# Patient Record
Sex: FEMALE | Race: WHITE | NOT HISPANIC OR LATINO | Employment: FULL TIME | URBAN - METROPOLITAN AREA
[De-identification: names, ages, dates, MRNs, and addresses within clinical notes are randomized per-mention and may not be internally consistent; named-entity substitution may affect disease eponyms.]

---

## 2022-01-18 ENCOUNTER — OFFICE VISIT (OUTPATIENT)
Dept: FAMILY MEDICINE CLINIC | Facility: CLINIC | Age: 62
End: 2022-01-18

## 2022-01-18 VITALS
SYSTOLIC BLOOD PRESSURE: 138 MMHG | HEIGHT: 61 IN | DIASTOLIC BLOOD PRESSURE: 86 MMHG | OXYGEN SATURATION: 99 % | HEART RATE: 90 BPM | BODY MASS INDEX: 31.75 KG/M2 | WEIGHT: 168.2 LBS

## 2022-01-18 DIAGNOSIS — Z12.4 SCREENING FOR CERVICAL CANCER: ICD-10-CM

## 2022-01-18 DIAGNOSIS — Z86.010 H/O COLONOSCOPY WITH POLYPECTOMY: ICD-10-CM

## 2022-01-18 DIAGNOSIS — E11.69 DIABETES MELLITUS TYPE 2 IN OBESE: ICD-10-CM

## 2022-01-18 DIAGNOSIS — E55.9 VITAMIN D DEFICIENCY: ICD-10-CM

## 2022-01-18 DIAGNOSIS — E66.9 DIABETES MELLITUS TYPE 2 IN OBESE: ICD-10-CM

## 2022-01-18 DIAGNOSIS — E61.1 IRON DEFICIENCY: ICD-10-CM

## 2022-01-18 DIAGNOSIS — Z80.3 FH: BREAST CANCER: ICD-10-CM

## 2022-01-18 DIAGNOSIS — I10 PRIMARY HYPERTENSION: ICD-10-CM

## 2022-01-18 DIAGNOSIS — E53.8 VITAMIN B12 DEFICIENCY: ICD-10-CM

## 2022-01-18 DIAGNOSIS — Z98.890 H/O COLONOSCOPY WITH POLYPECTOMY: ICD-10-CM

## 2022-01-18 DIAGNOSIS — K64.9 HEMORRHOIDS, UNSPECIFIED HEMORRHOID TYPE: ICD-10-CM

## 2022-01-18 DIAGNOSIS — Z00.00 WELL ADULT EXAM: Primary | ICD-10-CM

## 2022-01-18 DIAGNOSIS — R15.1 FECAL SMEARING: ICD-10-CM

## 2022-01-18 PROBLEM — Z86.0100 H/O COLONOSCOPY WITH POLYPECTOMY: Status: ACTIVE | Noted: 2022-01-18

## 2022-01-18 PROCEDURE — 99386 PREV VISIT NEW AGE 40-64: CPT | Performed by: FAMILY MEDICINE

## 2022-01-18 PROCEDURE — 96372 THER/PROPH/DIAG INJ SC/IM: CPT | Performed by: FAMILY MEDICINE

## 2022-01-18 RX ORDER — GALCANEZUMAB 120 MG/ML
120 INJECTION, SOLUTION SUBCUTANEOUS
COMMUNITY
End: 2022-03-18 | Stop reason: SDUPTHER

## 2022-01-18 RX ORDER — DULOXETIN HYDROCHLORIDE 60 MG/1
120 CAPSULE, DELAYED RELEASE ORAL DAILY
COMMUNITY
End: 2023-04-04 | Stop reason: DRUGHIGH

## 2022-01-18 RX ORDER — CYANOCOBALAMIN 1000 UG/ML
1000 INJECTION, SOLUTION INTRAMUSCULAR; SUBCUTANEOUS
Status: SHIPPED | OUTPATIENT
Start: 2022-01-18

## 2022-01-18 RX ORDER — LAMOTRIGINE 150 MG/1
150 TABLET ORAL 2 TIMES DAILY
COMMUNITY
End: 2023-01-18 | Stop reason: ALTCHOICE

## 2022-01-18 RX ORDER — DULAGLUTIDE 1.5 MG/.5ML
INJECTION, SOLUTION SUBCUTANEOUS
COMMUNITY
End: 2022-05-18

## 2022-01-18 RX ORDER — QUINAPRIL HCL AND HYDROCHLOROTHIAZIDE 20; 25 MG/1; MG/1
TABLET ORAL
COMMUNITY
Start: 2022-01-15 | End: 2022-07-25 | Stop reason: SDUPTHER

## 2022-01-18 RX ORDER — GLYCOPYRROLATE 2 MG/1
2 TABLET ORAL 2 TIMES DAILY
COMMUNITY
End: 2023-03-07

## 2022-01-18 RX ORDER — DEXTROAMPHETAMINE SACCHARATE, AMPHETAMINE ASPARTATE, DEXTROAMPHETAMINE SULFATE AND AMPHETAMINE SULFATE 5; 5; 5; 5 MG/1; MG/1; MG/1; MG/1
20 TABLET ORAL DAILY
COMMUNITY
End: 2022-08-12 | Stop reason: SDUPTHER

## 2022-01-18 RX ORDER — METFORMIN HYDROCHLORIDE 750 MG/1
750 TABLET, EXTENDED RELEASE ORAL
COMMUNITY
End: 2022-02-21 | Stop reason: SDUPTHER

## 2022-01-18 RX ORDER — DEXTROAMPHETAMINE SACCHARATE, AMPHETAMINE ASPARTATE MONOHYDRATE, DEXTROAMPHETAMINE SULFATE AND AMPHETAMINE SULFATE 5; 5; 5; 5 MG/1; MG/1; MG/1; MG/1
20 CAPSULE, EXTENDED RELEASE ORAL EVERY MORNING
COMMUNITY
End: 2023-03-07

## 2022-01-18 RX ADMIN — CYANOCOBALAMIN 1000 MCG: 1000 INJECTION, SOLUTION INTRAMUSCULAR; SUBCUTANEOUS at 15:34

## 2022-01-18 NOTE — PROGRESS NOTES
"Chief Complaint  Establish Care, Annual Exam, Gynecologic Exam (Pt states that she normally sees [facility] but they have left and would like to get that done today. ), Diarrhea, Fatigue (Pt states that she had labs done and her D3,B-12, and Iron were low. ), and Hemorrhoids    Subjective          Vivian Galvez presents to [facility] for   History of Present Illness     Teaching for 24 years.  for .     No h/o abnormal pap smears.     Mammograms every year, done at [facility] done in Summer 2021. No h/o abnormal mammogram.     She had a couple colon polyps removed, last colonoscopy done around 3 years ago with [facility] in [place]. She saw GI in [place] and was prescribed glycopyrrolate and has helped diarrhea. She had urgent fecal incontinence or a little leaks out. Hemorrhoids get inflamed since she gave birth, she has used hydrocortisone cream. Diarrhea makes hemorrhoids more painful.     A few weeks ago blood work. B12 shots, prescription vitamin D and start multivitamin with iron. B12 shot every 2 weeks, last injection 2 weeks ago. 6 months ago she had 3 rounds of b12 injection.     She hasn't had A1c checked in over a year. Using trulicity and metformin.         Objective   Vital Signs:   Vitals:    01/18/22 1442   BP: 138/86   Pulse: 90   SpO2: 99%   Weight: 76.3 kg (168 lb 3.2 oz)   Height: 154.9 cm (61\")   PainSc: 0-No pain     Body mass index is 31.78 kg/m².      Physical Exam  Vitals reviewed. Exam conducted with a chaperone present.   Constitutional:       General: She is not in acute distress.     Appearance: She is obese. She is not ill-appearing.   HENT:      Right Ear: Tympanic membrane and ear canal normal.      Left Ear: Tympanic membrane and ear canal normal.   Eyes:      General:         Right eye: No discharge.         Left eye: No discharge.      Conjunctiva/sclera: Conjunctivae normal.   Neck:      Thyroid: No thyromegaly. "   Cardiovascular:      Rate and Rhythm: Normal rate and regular rhythm.   Pulmonary:      Effort: Pulmonary effort is normal. No respiratory distress.      Breath sounds: Normal breath sounds.   Chest:   Breasts:      Right: Normal. No mass, nipple discharge, skin change, tenderness, axillary adenopathy or supraclavicular adenopathy.      Left: Normal. No mass, nipple discharge, skin change, tenderness, axillary adenopathy or supraclavicular adenopathy.       Abdominal:      Palpations: Abdomen is soft.      Tenderness: There is no abdominal tenderness.   Genitourinary:     General: Normal vulva.      Exam position: Lithotomy position.      Pubic Area: No rash.       Labia:         Right: No lesion.         Left: No lesion.       Urethra: No urethral lesion.      Vagina: Normal.      Cervix: No discharge, erythema or cervical bleeding.      Uterus: Normal.       Adnexa:         Right: No mass or tenderness.          Left: No mass or tenderness.        Rectum: No external hemorrhoid.      Comments: Normal external genitalia  Questionable small endocervical polyp    Musculoskeletal:      Cervical back: Neck supple.      Right lower leg: No edema.      Left lower leg: No edema.   Lymphadenopathy:      Head:      Right side of head: No submandibular, preauricular or posterior auricular adenopathy.      Left side of head: No submandibular, preauricular or posterior auricular adenopathy.      Cervical: No cervical adenopathy.      Right cervical: No superficial cervical adenopathy.     Left cervical: No superficial cervical adenopathy.      Upper Body:      Right upper body: No supraclavicular or axillary adenopathy.      Left upper body: No supraclavicular or axillary adenopathy.   Skin:     General: Skin is warm and dry.      Findings: No rash.   Neurological:      Mental Status: She is alert and oriented to person, place, and time.      Gait: Gait normal.   Psychiatric:         Mood and Affect: Mood normal.          Behavior: Behavior normal.         Thought Content: Thought content normal.         Judgment: Judgment normal.        Result Review :                Immunization History   Administered Date(s) Administered   • COVID-19 (MODERNA) 1st, 2nd, 3rd Dose Only 01/28/2021, 02/25/2021, 12/17/2021   • FluLaval/Fluarix/Fluzone >6 11/01/2021   • Hepatitis A 11/13/2019       Health Maintenance   Topic Date Due   • MAMMOGRAM  Never done   • TDAP/TD VACCINES (1 - Tdap) Never done   • ZOSTER VACCINE (1 of 2) Never done   • INFLUENZA VACCINE  Never done   • PAP SMEAR  Never done     Administrations This Visit     cyanocobalamin injection 1,000 mcg     Admin Date  01/18/2022 Action  Given Dose  1,000 mcg Route  Intramuscular Administered By  Regina Bautista MA                     Assessment and Plan    Diagnoses and all orders for this visit:    1. Well adult exam (Primary)  -     CBC (No Diff); Future  -     Comprehensive Metabolic Panel; Future  -     TSH Rfx On Abnormal To Free T4; Future  -     Lipid Panel; Future  -     Hemoglobin A1c; Future  -     Vitamin D 25 Hydroxy; Future  -     Vitamin B12; Future  -     Iron Profile; Future  Return when fasting to check labs.  Mammogram, colonoscopy, and previous primary care records requested.  2. Screening for cervical cancer  -     IGP,rfx Aptima HPV All Pth; Future  Pap today with cotesting and if normal will not need further Pap smears.  3. Vitamin D deficiency  -     Vitamin D 25 Hydroxy; Future  Currently on prescription replacement check level with next labs  4. Vitamin B12 deficiency  -     cyanocobalamin injection 1,000 mcg  -     Vitamin B12; Future  Currently on injections and she was due today which was administered and she tolerated the injection well.  Check follow-up labs.  5. Iron deficiency  -     CBC (No Diff); Future  -     Iron Profile; Future  Currently on oral replacement.  Check follow-up labs.  6. FH: breast cancer  Recommend yearly mammograms.  Clinical breast  exam normal today as well.  7. H/O colonoscopy with polypectomy  She reports her last colonoscopy 3 years ago.  She does have a history of polyps and advised 5-year recall's.  8. Fecal smearing  Previous gastroenterologist has prescribed glycopyrrolate which controls her symptoms.  9. Hemorrhoids, unspecified hemorrhoid type  Discussed with patient using steroid cream for symptomatic relief or if worsening consultation with colorectal surgeons to see if she would be a candidate for procedure.  10. Diabetes mellitus type 2 in obese (HCC)  -     Comprehensive Metabolic Panel; Future  -     Lipid Panel; Future  -     Hemoglobin A1c; Future  Check complete set of labs.  A1c goal less than 6.5.  11. Primary hypertension  Blood pressure well controlled today continue current dose of quinapril hydrochlorothiazide.      Counseled on health maintenance topics and preventative care recommendations: cervical cancer screening, breast cancer screening, colon cancer screening, supplements ,      Follow Up   Return in about 4 months (around 5/18/2022) for Office visit diabetes and physical in 1 year.  Patient was given instructions and counseling regarding her condition or for health maintenance advice. Please see specific information pulled into the AVS if appropriate.     Electronically signed by Peyton Flynn MD, 01/18/22, 3:46 PM EST.

## 2022-01-28 LAB
25(OH)D3+25(OH)D2 SERPL-MCNC: 26.8 NG/ML (ref 30–100)
ALBUMIN SERPL-MCNC: 4.3 G/DL (ref 3.8–4.8)
ALBUMIN/GLOB SERPL: 1.8 {RATIO} (ref 1.2–2.2)
ALP SERPL-CCNC: 53 IU/L (ref 44–121)
ALT SERPL-CCNC: 17 IU/L (ref 0–32)
AMBIG ABBREV CMP14 DEFAULT: NORMAL
AMBIG ABBREV LP DEFAULT: NORMAL
AST SERPL-CCNC: 19 IU/L (ref 0–40)
BASOPHILS # BLD AUTO: 0.1 X10E3/UL (ref 0–0.2)
BASOPHILS NFR BLD AUTO: 1 %
BILIRUB SERPL-MCNC: 0.4 MG/DL (ref 0–1.2)
BUN SERPL-MCNC: 12 MG/DL (ref 8–27)
BUN/CREAT SERPL: 19 (ref 12–28)
CALCIUM SERPL-MCNC: 10.1 MG/DL (ref 8.7–10.3)
CHLORIDE SERPL-SCNC: 100 MMOL/L (ref 96–106)
CHOLEST SERPL-MCNC: 241 MG/DL (ref 100–199)
CO2 SERPL-SCNC: 26 MMOL/L (ref 20–29)
CREAT SERPL-MCNC: 0.62 MG/DL (ref 0.57–1)
EOSINOPHIL # BLD AUTO: 0.2 X10E3/UL (ref 0–0.4)
EOSINOPHIL NFR BLD AUTO: 4 %
ERYTHROCYTE [DISTWIDTH] IN BLOOD BY AUTOMATED COUNT: 16.1 % (ref 11.7–15.4)
GLOBULIN SER CALC-MCNC: 2.4 G/DL (ref 1.5–4.5)
GLUCOSE SERPL-MCNC: 93 MG/DL (ref 65–99)
HBA1C MFR BLD: 6.2 % (ref 4.8–5.6)
HCT VFR BLD AUTO: 36 % (ref 34–46.6)
HDLC SERPL-MCNC: 74 MG/DL
HGB BLD-MCNC: 10.5 G/DL (ref 11.1–15.9)
IMM GRANULOCYTES # BLD AUTO: 0 X10E3/UL (ref 0–0.1)
IMM GRANULOCYTES NFR BLD AUTO: 0 %
IRON SERPL-MCNC: 79 UG/DL (ref 27–139)
LDLC SERPL CALC-MCNC: 134 MG/DL (ref 0–99)
LYMPHOCYTES # BLD AUTO: 2.3 X10E3/UL (ref 0.7–3.1)
LYMPHOCYTES NFR BLD AUTO: 39 %
MCH RBC QN AUTO: 19.8 PG (ref 26.6–33)
MCHC RBC AUTO-ENTMCNC: 29.2 G/DL (ref 31.5–35.7)
MCV RBC AUTO: 68 FL (ref 79–97)
MONOCYTES # BLD AUTO: 0.5 X10E3/UL (ref 0.1–0.9)
MONOCYTES NFR BLD AUTO: 9 %
NEUTROPHILS # BLD AUTO: 2.7 X10E3/UL (ref 1.4–7)
NEUTROPHILS NFR BLD AUTO: 47 %
PLATELET # BLD AUTO: 531 X10E3/UL (ref 150–450)
POTASSIUM SERPL-SCNC: 4.7 MMOL/L (ref 3.5–5.2)
PROT SERPL-MCNC: 6.7 G/DL (ref 6–8.5)
RBC # BLD AUTO: 5.29 X10E6/UL (ref 3.77–5.28)
SODIUM SERPL-SCNC: 140 MMOL/L (ref 134–144)
TRIGL SERPL-MCNC: 190 MG/DL (ref 0–149)
TSH SERPL-ACNC: 0.79 UIU/ML (ref 0.45–4.5)
VIT B12 SERPL-MCNC: 468 PG/ML (ref 232–1245)
VLDLC SERPL CALC-MCNC: 33 MG/DL (ref 5–40)
WBC # BLD AUTO: 5.8 X10E3/UL (ref 3.4–10.8)

## 2022-01-31 ENCOUNTER — TELEPHONE (OUTPATIENT)
Dept: FAMILY MEDICINE CLINIC | Facility: CLINIC | Age: 62
End: 2022-01-31

## 2022-01-31 DIAGNOSIS — E78.5 DYSLIPIDEMIA: Primary | ICD-10-CM

## 2022-01-31 RX ORDER — ATORVASTATIN CALCIUM 20 MG/1
20 TABLET, FILM COATED ORAL NIGHTLY
Qty: 90 TABLET | Refills: 3 | Status: SHIPPED | OUTPATIENT
Start: 2022-01-31 | End: 2022-12-06 | Stop reason: SDUPTHER

## 2022-01-31 RX ORDER — CYANOCOBALAMIN 1000 UG/ML
INJECTION, SOLUTION INTRAMUSCULAR; SUBCUTANEOUS
COMMUNITY
Start: 2022-01-22 | End: 2022-12-15 | Stop reason: SDUPTHER

## 2022-01-31 NOTE — TELEPHONE ENCOUNTER
Called and spoke with pt. Informed pt per EDS, The test results show anemia.  Normal white blood cell count.  Sugar and A1c show good diabetes control with A1c less than 6.5.  Normal kidney function, electrolytes, liver function, and thyroid function.  Vitamin D is borderline decreased.  Normal iron and B12 levels.  Cholesterol level is high, I recommend starting atorvastatin at nighttime to help reduce heart attack and stroke risk which has been sent to pts pharmacy.  Plan to repeat fasting labs at your next visit. Pt verbalized understanding and has no further questions at this time.

## 2022-01-31 NOTE — TELEPHONE ENCOUNTER
Please call patient about abnormal cholesterol results and need for new medication.  I also sent a letter in my chart.      The test results show anemia.  Normal white blood cell count.  Sugar and A1c show good diabetes control with A1c less than 6.5.  Normal kidney function, electrolytes, liver function, and thyroid function.  Vitamin D is borderline decreased.  Normal iron and B12 levels.  Cholesterol level is high, I recommend starting atorvastatin at nighttime to help reduce heart attack and stroke risk.  Plan to repeat fasting labs at your next visit.

## 2022-02-02 ENCOUNTER — TELEPHONE (OUTPATIENT)
Dept: FAMILY MEDICINE CLINIC | Facility: CLINIC | Age: 62
End: 2022-02-02

## 2022-02-02 NOTE — TELEPHONE ENCOUNTER
Caller: [facility]     Best call back number: [phone]    What is the best time to reach you: ANYTIME     Who are you requesting to speak with (clinical staff, provider,  specific staff member):     What was the call regarding: THE CENTRALIZED RECORDS DEPARTMENT IS CALLING TO SEE WHAT IS THE TIME FRAME THAT THIS OFFICE IS NEEDING THE RECORDS THAT THEY ARE REQUESTING. THEY STATE THAT THEY RECEIVED A FAX BUT TH BOTTOM OF THE FAX DID NOT COME THROUGH CLEAR ENOUGH FOR THEM READ.     Do you require a callback: YES

## 2022-02-21 RX ORDER — METFORMIN HYDROCHLORIDE 750 MG/1
750 TABLET, EXTENDED RELEASE ORAL
Qty: 90 TABLET | Refills: 1 | Status: SHIPPED | OUTPATIENT
Start: 2022-02-21 | End: 2022-08-08

## 2022-02-21 NOTE — TELEPHONE ENCOUNTER
Rx Refill Note  Requested Prescriptions     Pending Prescriptions Disp Refills   • metFORMIN ER (GLUCOPHAGE-XR) 750 MG 24 hr tablet 90 tablet 1     Sig: Take 1 tablet by mouth Daily With Breakfast.      Last office visit with prescribing clinician: 1/18/2022      Next office visit with prescribing clinician: 5/18/2022            Erika Hoang MA  02/21/22, 08:51 EST

## 2022-03-02 ENCOUNTER — CLINICAL SUPPORT (OUTPATIENT)
Dept: FAMILY MEDICINE CLINIC | Facility: CLINIC | Age: 62
End: 2022-03-02

## 2022-03-02 PROCEDURE — 96372 THER/PROPH/DIAG INJ SC/IM: CPT | Performed by: FAMILY MEDICINE

## 2022-03-02 RX ADMIN — CYANOCOBALAMIN 1000 MCG: 1000 INJECTION, SOLUTION INTRAMUSCULAR; SUBCUTANEOUS at 15:04

## 2022-03-18 NOTE — TELEPHONE ENCOUNTER
Rx Refill Note  Requested Prescriptions     Pending Prescriptions Disp Refills   • galcanezumab-gnlm (Emgality) 120 MG/ML auto-injector pen 1.12 mL 1     Sig: Inject 1 mL under the skin into the appropriate area as directed Every 30 (Thirty) Days.      Last office visit with prescribing clinician: 1/18/2022      Next office visit with prescribing clinician: 5/18/2022            Erika Hoang MA  03/18/22, 16:48 EDT

## 2022-03-21 RX ORDER — GALCANEZUMAB 120 MG/ML
120 INJECTION, SOLUTION SUBCUTANEOUS
Qty: 1.12 ML | Refills: 1 | Status: SHIPPED | OUTPATIENT
Start: 2022-03-21 | End: 2022-05-18 | Stop reason: SDUPTHER

## 2022-04-12 DIAGNOSIS — E53.8 B12 DEFICIENCY: Primary | ICD-10-CM

## 2022-04-12 RX ORDER — NEEDLES, DISPOSABLE 25GX5/8"
1 NEEDLE, DISPOSABLE MISCELLANEOUS WEEKLY
Qty: 10 EACH | Refills: 5 | Status: CANCELLED | OUTPATIENT
Start: 2022-04-12

## 2022-04-12 RX ORDER — SYRINGE W-NEEDLE,DISPOSAB,3 ML 25GX5/8"
1 SYRINGE, EMPTY DISPOSABLE MISCELLANEOUS DAILY
Qty: 10 EACH | Refills: 5 | Status: SHIPPED | OUTPATIENT
Start: 2022-04-12

## 2022-04-12 RX ORDER — NEEDLES, DISPOSABLE 25GX5/8"
1 NEEDLE, DISPOSABLE MISCELLANEOUS
Qty: 10 EACH | Refills: 5 | Status: SHIPPED | OUTPATIENT
Start: 2022-04-12 | End: 2023-03-06 | Stop reason: SDUPTHER

## 2022-04-12 NOTE — TELEPHONE ENCOUNTER
"Rx Refill Note  Requested Prescriptions     Signed Prescriptions Disp Refills   • Syringe 20G X 1\" 3 ML misc 10 each 5     Si each Daily.     Authorizing Provider: ALEA SANTOS     Ordering User: ERIKA HOANG   • Needle, Disp, (BD Disp Needles) 21G X 1-1/2\" misc 10 each 5     Si each Every 30 (Thirty) Days.     Authorizing Provider: ALEA SANTOS     Ordering User: ERIKA HOANG      Last office visit with prescribing clinician: 2022      Next office visit with prescribing clinician: 2022            Erika Hoang MA  22, 12:21 EDT     Attempted to contact, no answer. Left message to advise patient of prescriptions sent for supplies   "

## 2022-05-18 ENCOUNTER — OFFICE VISIT (OUTPATIENT)
Dept: FAMILY MEDICINE CLINIC | Facility: CLINIC | Age: 62
End: 2022-05-18

## 2022-05-18 ENCOUNTER — LAB (OUTPATIENT)
Dept: LAB | Facility: HOSPITAL | Age: 62
End: 2022-05-18

## 2022-05-18 VITALS
SYSTOLIC BLOOD PRESSURE: 138 MMHG | TEMPERATURE: 98.6 F | HEART RATE: 99 BPM | DIASTOLIC BLOOD PRESSURE: 71 MMHG | OXYGEN SATURATION: 97 % | BODY MASS INDEX: 33.68 KG/M2 | HEIGHT: 61 IN | WEIGHT: 178.4 LBS

## 2022-05-18 DIAGNOSIS — Z00.00 WELL ADULT EXAM: ICD-10-CM

## 2022-05-18 DIAGNOSIS — E53.8 B12 DEFICIENCY: ICD-10-CM

## 2022-05-18 DIAGNOSIS — E11.69 DIABETES MELLITUS TYPE 2 IN OBESE: Primary | ICD-10-CM

## 2022-05-18 DIAGNOSIS — I10 PRIMARY HYPERTENSION: ICD-10-CM

## 2022-05-18 DIAGNOSIS — E66.9 DIABETES MELLITUS TYPE 2 IN OBESE: Primary | ICD-10-CM

## 2022-05-18 DIAGNOSIS — A60.04 HERPES SIMPLEX VULVOVAGINITIS: ICD-10-CM

## 2022-05-18 DIAGNOSIS — R53.83 OTHER FATIGUE: ICD-10-CM

## 2022-05-18 DIAGNOSIS — G43.009 MIGRAINE WITHOUT AURA AND WITHOUT STATUS MIGRAINOSUS, NOT INTRACTABLE: ICD-10-CM

## 2022-05-18 DIAGNOSIS — E53.8 VITAMIN B12 DEFICIENCY: ICD-10-CM

## 2022-05-18 LAB
EXPIRATION DATE: NORMAL
HBA1C MFR BLD: 6.8 %
Lab: NORMAL

## 2022-05-18 PROCEDURE — 82607 VITAMIN B-12: CPT

## 2022-05-18 PROCEDURE — 83036 HEMOGLOBIN GLYCOSYLATED A1C: CPT | Performed by: FAMILY MEDICINE

## 2022-05-18 PROCEDURE — 99214 OFFICE O/P EST MOD 30 MIN: CPT | Performed by: FAMILY MEDICINE

## 2022-05-18 RX ORDER — LAMOTRIGINE 200 MG/1
TABLET ORAL
COMMUNITY
Start: 2022-04-29 | End: 2022-11-18 | Stop reason: DRUGHIGH

## 2022-05-18 RX ORDER — DULAGLUTIDE 3 MG/.5ML
3 INJECTION, SOLUTION SUBCUTANEOUS WEEKLY
Qty: 4 PEN | Refills: 5 | Status: SHIPPED | OUTPATIENT
Start: 2022-05-18 | End: 2022-09-27 | Stop reason: SDUPTHER

## 2022-05-18 RX ORDER — VALACYCLOVIR HYDROCHLORIDE 500 MG/1
500 TABLET, FILM COATED ORAL 2 TIMES DAILY
Qty: 12 TABLET | Refills: 3 | Status: SHIPPED | OUTPATIENT
Start: 2022-05-18

## 2022-05-18 RX ORDER — GALCANEZUMAB 120 MG/ML
120 INJECTION, SOLUTION SUBCUTANEOUS
Qty: 1 ML | Refills: 5 | Status: SHIPPED | OUTPATIENT
Start: 2022-05-18 | End: 2022-12-15

## 2022-05-18 NOTE — PROGRESS NOTES
"Chief Complaint  Diabetes (Type 2)    Subjective          Vivian Galvez presents to [facility]  Diabetes  She presents for her follow-up diabetic visit. She has type 2 diabetes mellitus. Current diabetic treatment includes oral agent (monotherapy) (GLP-1a). An ACE inhibitor/angiotensin II receptor blocker is being taken.     Home glucose monitoring in mid afternoon 100-120. She has had a bigger appetite the past month or so. She is suffering from significant anxiety. She eats when she is stressed.   She has appointment next week with psychiatrist.     No home blood pressure monitoring.     She is tired, sleepiness, fatigue. B12 shots have helped but not enough. She is interested in sleep study.     Emgality for migraines.     She has outbreaks of genital herpes around twice a year.  Her last outbreak occurred about a month ago.      Objective   Vital Signs:  /71   Pulse 99   Temp 98.6 °F (37 °C)   Ht 154.9 cm (60.98\")   Wt 80.9 kg (178 lb 6.4 oz)   SpO2 97%   BMI 33.73 kg/m²    Vitals:    05/18/22 1541 05/18/22 1606   BP: 146/82 138/71   BP Location: Left arm    Patient Position: Sitting    Cuff Size: Adult    Pulse: 99    Temp: 98.6 °F (37 °C)    SpO2: 97%    Weight: 80.9 kg (178 lb 6.4 oz)    Height: 154.9 cm (60.98\")    PainSc: 0-No pain                Physical Exam  Vitals reviewed.   Constitutional:       General: She is not in acute distress.     Appearance: She is obese. She is not ill-appearing.   Cardiovascular:      Rate and Rhythm: Normal rate and regular rhythm.   Pulmonary:      Effort: Pulmonary effort is normal.      Breath sounds: Normal breath sounds.   Neurological:      Mental Status: She is alert.   Psychiatric:         Mood and Affect: Mood is anxious and depressed.        Result Review :   The following data was reviewed by: Peyton Flynn MD on 05/18/2022:  Common labs    Common Labsle 1/27/22 1/27/22 1/27/22 1/27/22 5/18/22    1146 1146 " 1146 1146    Glucose  93      BUN  12      Creatinine  0.62      eGFR Non  Am  98      eGFR African Am  113      Sodium  140      Potassium  4.7      Chloride  100      Calcium  10.1      Total Protein  6.7      Albumin  4.3      Total Bilirubin  0.4      Alkaline Phosphatase  53      AST (SGOT)  19      ALT (SGPT)  17      WBC 5.8       Hemoglobin 10.5 (A)       Hematocrit 36.0       Platelets 531 (A)       Total Cholesterol   241 (A)     Triglycerides   190 (A)     HDL Cholesterol   74     LDL Cholesterol    134 (A)     Hemoglobin A1C    6.2 (A) 6.8   (A) Abnormal value       Comments are available for some flowsheets but are not being displayed.                     Assessment and Plan    Diagnoses and all orders for this visit:    1. Diabetes mellitus type 2 in obese (HCC) (Primary)  -     POC Glycosylated Hemoglobin (Hb A1C)  -     Dulaglutide (Trulicity) 3 MG/0.5ML solution pen-injector; Inject 0.5 mL under the skin into the appropriate area as directed 1 (One) Time Per Week.  Dispense: 4 pen; Refill: 5  Uncontrolled.  Goal A1c less than 6.5.  Recommend checking morning fasting glucose readings as well.  Continue metformin.  Increase dose of Trulicity to 3 mg.  Recheck in 3 months.  2. Primary hypertension  Blood pressure improved on recheck.  Continue quinapril-hydrochlorothiazide.  3. Other fatigue  -     Ambulatory Referral to Sleep Medicine  Work-up for sleep apnea.  4. Migraine without aura and without status migrainosus, not intractable  -     galcanezumab-gnlm (Emgality) 120 MG/ML auto-injector pen; Inject 1 mL under the skin into the appropriate area as directed Every 30 (Thirty) Days.  Dispense: 1 mL; Refill: 5  Continue Emgality.  5. B12 deficiency  -     Vitamin B12; Future  Check follow-up labs since starting IM therapy  6. Herpes simplex vulvovaginitis  -     valACYclovir (Valtrex) 500 MG tablet; Take 1 tablet by mouth 2 (Two) Times a Day. For 3 days per episode  Dispense: 12 tablet; Refill:  3  Recommend episodic treatment with infrequent outbreaks.           Follow Up   Return in about 3 months (around 8/18/2022) for Office visit diabetes.  Patient was given instructions and counseling regarding her condition or for health maintenance advice. Please see specific information pulled into the AVS if appropriate.     Electronically signed by Peyton Flynn MD, 05/18/22, 4:12 PM EDT.

## 2022-05-18 NOTE — PATIENT INSTRUCTIONS
Goal blood pressure < 140/90.    Check fingerstick glucose in the morning before eating, goal <130.   Check fingerstick 2 hours after a meal, goal <180.   Goal A1c < 6.5.

## 2022-05-19 ENCOUNTER — TELEPHONE (OUTPATIENT)
Dept: FAMILY MEDICINE CLINIC | Facility: CLINIC | Age: 62
End: 2022-05-19

## 2022-05-19 LAB — VIT B12 BLD-MCNC: 410 PG/ML (ref 211–946)

## 2022-05-19 NOTE — TELEPHONE ENCOUNTER
Attempted to contact patient to relay results no answer left vm to call the office back       Per DR. Peyton Flynn MD   B12 level is in the normal range      Hub may relay message

## 2022-07-25 ENCOUNTER — PATIENT MESSAGE (OUTPATIENT)
Dept: FAMILY MEDICINE CLINIC | Facility: CLINIC | Age: 62
End: 2022-07-25

## 2022-07-25 RX ORDER — QUINAPRIL HCL AND HYDROCHLOROTHIAZIDE 20; 25 MG/1; MG/1
1 TABLET ORAL DAILY
Qty: 90 TABLET | Refills: 0 | Status: SHIPPED | OUTPATIENT
Start: 2022-07-25 | End: 2022-10-20

## 2022-08-08 RX ORDER — METFORMIN HYDROCHLORIDE 750 MG/1
TABLET, EXTENDED RELEASE ORAL
Qty: 90 TABLET | Refills: 1 | Status: SHIPPED | OUTPATIENT
Start: 2022-08-08 | End: 2022-09-27 | Stop reason: SDUPTHER

## 2022-08-12 ENCOUNTER — OFFICE VISIT (OUTPATIENT)
Dept: FAMILY MEDICINE CLINIC | Facility: CLINIC | Age: 62
End: 2022-08-12

## 2022-08-12 ENCOUNTER — LAB (OUTPATIENT)
Dept: LAB | Facility: HOSPITAL | Age: 62
End: 2022-08-12

## 2022-08-12 VITALS
HEART RATE: 74 BPM | WEIGHT: 178 LBS | SYSTOLIC BLOOD PRESSURE: 122 MMHG | OXYGEN SATURATION: 95 % | DIASTOLIC BLOOD PRESSURE: 80 MMHG | BODY MASS INDEX: 33.61 KG/M2 | HEIGHT: 61 IN

## 2022-08-12 DIAGNOSIS — E66.9 DIABETES MELLITUS TYPE 2 IN OBESE: ICD-10-CM

## 2022-08-12 DIAGNOSIS — R68.89 HEAT INTOLERANCE: ICD-10-CM

## 2022-08-12 DIAGNOSIS — Z23 NEED FOR VACCINATION: ICD-10-CM

## 2022-08-12 DIAGNOSIS — E78.5 DYSLIPIDEMIA: ICD-10-CM

## 2022-08-12 DIAGNOSIS — D64.9 ANEMIA, UNSPECIFIED TYPE: ICD-10-CM

## 2022-08-12 DIAGNOSIS — E11.9 TYPE 2 DIABETES MELLITUS WITHOUT COMPLICATION, WITHOUT LONG-TERM CURRENT USE OF INSULIN: Primary | ICD-10-CM

## 2022-08-12 DIAGNOSIS — E53.8 B12 DEFICIENCY: ICD-10-CM

## 2022-08-12 DIAGNOSIS — E55.9 VITAMIN D DEFICIENCY: ICD-10-CM

## 2022-08-12 DIAGNOSIS — R53.83 OTHER FATIGUE: ICD-10-CM

## 2022-08-12 DIAGNOSIS — E11.9 TYPE 2 DIABETES MELLITUS WITHOUT COMPLICATION, WITHOUT LONG-TERM CURRENT USE OF INSULIN: ICD-10-CM

## 2022-08-12 DIAGNOSIS — Z00.00 WELL ADULT EXAM: ICD-10-CM

## 2022-08-12 DIAGNOSIS — E11.69 DIABETES MELLITUS TYPE 2 IN OBESE: ICD-10-CM

## 2022-08-12 DIAGNOSIS — E61.1 IRON DEFICIENCY: ICD-10-CM

## 2022-08-12 LAB
25(OH)D3 SERPL-MCNC: 28 NG/ML (ref 30–100)
ALBUMIN SERPL-MCNC: 4.4 G/DL (ref 3.5–5.2)
ALBUMIN UR-MCNC: <1.2 MG/DL
ALBUMIN/GLOB SERPL: 1.8 G/DL
ALP SERPL-CCNC: 57 U/L (ref 39–117)
ALT SERPL W P-5'-P-CCNC: 17 U/L (ref 1–33)
ANION GAP SERPL CALCULATED.3IONS-SCNC: 11 MMOL/L (ref 5–15)
AST SERPL-CCNC: 18 U/L (ref 1–32)
BILIRUB SERPL-MCNC: 0.6 MG/DL (ref 0–1.2)
BUN SERPL-MCNC: 14 MG/DL (ref 8–23)
BUN/CREAT SERPL: 20 (ref 7–25)
CALCIUM SPEC-SCNC: 10 MG/DL (ref 8.6–10.5)
CHLORIDE SERPL-SCNC: 98 MMOL/L (ref 98–107)
CHOLEST SERPL-MCNC: 181 MG/DL (ref 0–200)
CO2 SERPL-SCNC: 30 MMOL/L (ref 22–29)
CREAT SERPL-MCNC: 0.7 MG/DL (ref 0.57–1)
DEPRECATED RDW RBC AUTO: 37.9 FL (ref 37–54)
EGFRCR SERPLBLD CKD-EPI 2021: 98.5 ML/MIN/1.73
ERYTHROCYTE [DISTWIDTH] IN BLOOD BY AUTOMATED COUNT: 15.5 % (ref 12.3–15.4)
GLOBULIN UR ELPH-MCNC: 2.4 GM/DL
GLUCOSE SERPL-MCNC: 120 MG/DL (ref 65–99)
HBA1C MFR BLD: 6.4 % (ref 4.8–5.6)
HCT VFR BLD AUTO: 38 % (ref 34–46.6)
HDLC SERPL-MCNC: 75 MG/DL (ref 40–60)
HGB BLD-MCNC: 11 G/DL (ref 12–15.9)
IRON 24H UR-MRATE: 64 MCG/DL (ref 37–145)
IRON SATN MFR SERPL: 12 % (ref 20–50)
LDLC SERPL CALC-MCNC: 80 MG/DL (ref 0–100)
LDLC/HDLC SERPL: 1.01 {RATIO}
MCH RBC QN AUTO: 20.1 PG (ref 26.6–33)
MCHC RBC AUTO-ENTMCNC: 28.9 G/DL (ref 31.5–35.7)
MCV RBC AUTO: 69.5 FL (ref 79–97)
PLATELET # BLD AUTO: 488 10*3/MM3 (ref 140–450)
PMV BLD AUTO: 9.4 FL (ref 6–12)
POTASSIUM SERPL-SCNC: 4.3 MMOL/L (ref 3.5–5.2)
PROT SERPL-MCNC: 6.8 G/DL (ref 6–8.5)
RBC # BLD AUTO: 5.47 10*6/MM3 (ref 3.77–5.28)
SODIUM SERPL-SCNC: 139 MMOL/L (ref 136–145)
TIBC SERPL-MCNC: 530 MCG/DL (ref 298–536)
TRANSFERRIN SERPL-MCNC: 356 MG/DL (ref 200–360)
TRIGL SERPL-MCNC: 153 MG/DL (ref 0–150)
TSH SERPL DL<=0.05 MIU/L-ACNC: 1.07 UIU/ML (ref 0.27–4.2)
VIT B12 BLD-MCNC: 358 PG/ML (ref 211–946)
VLDLC SERPL-MCNC: 26 MG/DL (ref 5–40)
WBC NRBC COR # BLD: 6.64 10*3/MM3 (ref 3.4–10.8)

## 2022-08-12 PROCEDURE — 82607 VITAMIN B-12: CPT

## 2022-08-12 PROCEDURE — 86038 ANTINUCLEAR ANTIBODIES: CPT

## 2022-08-12 PROCEDURE — 36415 COLL VENOUS BLD VENIPUNCTURE: CPT

## 2022-08-12 PROCEDURE — 83540 ASSAY OF IRON: CPT

## 2022-08-12 PROCEDURE — 99214 OFFICE O/P EST MOD 30 MIN: CPT | Performed by: FAMILY MEDICINE

## 2022-08-12 PROCEDURE — 84466 ASSAY OF TRANSFERRIN: CPT

## 2022-08-12 PROCEDURE — 80061 LIPID PANEL: CPT

## 2022-08-12 PROCEDURE — 90471 IMMUNIZATION ADMIN: CPT | Performed by: FAMILY MEDICINE

## 2022-08-12 PROCEDURE — 82306 VITAMIN D 25 HYDROXY: CPT

## 2022-08-12 PROCEDURE — 82043 UR ALBUMIN QUANTITATIVE: CPT

## 2022-08-12 PROCEDURE — 83036 HEMOGLOBIN GLYCOSYLATED A1C: CPT

## 2022-08-12 PROCEDURE — 90677 PCV20 VACCINE IM: CPT | Performed by: FAMILY MEDICINE

## 2022-08-12 PROCEDURE — 80050 GENERAL HEALTH PANEL: CPT

## 2022-08-12 NOTE — PROGRESS NOTES
"Chief Complaint  Diabetes    Subjective        Vivian Galvez presents to [facility]  Diabetes  She presents for her follow-up diabetic visit. She has type 2 diabetes mellitus. Current diabetic treatment includes oral agent (monotherapy) (GLP-1a). An ACE inhibitor/angiotensin II receptor blocker is being taken.       She saw psychiatrist yesterday about saliva hormone testing. She has fatigue and tired all the time. Last period around age 46. No menopausal symptoms. She is hot all the time. Affecting quality of life. No arthralgia or myalgia. Stress fracture in left foot and tendonitis in right foot. She wasn't able to do her job and now early long-term. She is depressed. Psychiatrist has tired many medications, no recent changes.   Some morning she doesn't feel like she has woken up and drowsy. Last colonoscopy around 4 years ago.     She checks blood sugar and it has been around 130. Highest 180. She doesn't have as much protein in her diet that she likes. She lives alone and has convience foods. She has tried protein shakes for breakfast.       Objective   Vital Signs:  /80   Pulse 74   Ht 154.9 cm (60.98\")   Wt 80.7 kg (178 lb)   SpO2 95%   BMI 33.65 kg/m²   Estimated body mass index is 33.65 kg/m² as calculated from the following:    Height as of this encounter: 154.9 cm (60.98\").    Weight as of this encounter: 80.7 kg (178 lb).          Physical Exam  Vitals reviewed.   Constitutional:       General: She is not in acute distress.     Appearance: She is obese. She is not ill-appearing.   Neck:      Thyroid: No thyroid mass, thyromegaly or thyroid tenderness.   Cardiovascular:      Rate and Rhythm: Normal rate and regular rhythm.   Pulmonary:      Effort: Pulmonary effort is normal.      Breath sounds: Normal breath sounds.   Neurological:      Mental Status: She is alert.   Psychiatric:         Mood and Affect: Mood is depressed.        Result Review :{Labs  Result " Review  Imaging  Med Tab  Media  Procedures  :23}                 Immunization History   Administered Date(s) Administered   • COVID-19 (MODERNA) 1st, 2nd, 3rd Dose Only 01/28/2021, 02/25/2021, 12/17/2021   • FluLaval/Fluarix/Fluzone >6 11/01/2021   • Hepatitis A 11/13/2019   • Pneumococcal Conjugate 20-Valent (PCV20) 08/12/2022       Assessment and Plan {CC Problem List  Visit Diagnosis   ROS  Review (Popup)  Health Maintenance  Quality  BestPractice  Medications  SmartSets  SnapShot Encounters  Media :23}  Diagnoses and all orders for this visit:    1. Type 2 diabetes mellitus without complication, without long-term current use of insulin (HCC) (Primary)  -     Hemoglobin A1c; Future  -     MicroAlbumin, Urine, Random - Urine, Clean Catch; Future  -     Comprehensive Metabolic Panel; Future  -     Lipid Panel; Future  Point-of-care machine unavailable we will check blood work for A1c.  A1c goal 6.5.  If still not at goal plan to increase Trulicity to 4.5 mg and continue metformin.  Recheck in 3 months.  2. Dyslipidemia  -     Comprehensive Metabolic Panel; Future  -     Lipid Panel; Future  Initiated on atorvastatin 20 mg at the end of January.  Check follow-up labs today.  3. Vitamin D deficiency  -     Vitamin D 25 Hydroxy; Future  Borderline decreased check follow-up labs today.  4. Anemia, unspecified type  -     CBC (No Diff); Future  Check follow-up labs for anemia.  Outside colonoscopy records also requested.  5. Heat intolerance  -     TSH Rfx On Abnormal To Free T4; Future    6. Other fatigue  -     TSH Rfx On Abnormal To Free T4; Future  -     CBC (No Diff); Future  -     LAURA; Future  Check labs.  7. Need for vaccination  -     Pneumococcal Conjugate Vaccine 20-Valent All             Follow Up   Return in about 3 months (around 11/12/2022) for Office visit diabetes.  Patient was given instructions and counseling regarding her condition or for health maintenance advice. Please see specific  information pulled into the AVS if appropriate.     Electronically signed by Peyton Flynn MD, 08/12/22, 1:16 PM EDT.

## 2022-08-15 ENCOUNTER — TELEPHONE (OUTPATIENT)
Dept: FAMILY MEDICINE CLINIC | Facility: CLINIC | Age: 62
End: 2022-08-15

## 2022-08-15 LAB — ANA SER QL: NEGATIVE

## 2022-08-15 RX ORDER — ERGOCALCIFEROL 1.25 MG/1
50000 CAPSULE ORAL WEEKLY
Qty: 25 CAPSULE | Refills: 0 | Status: SHIPPED | OUTPATIENT
Start: 2022-08-15 | End: 2022-11-18

## 2022-08-15 NOTE — TELEPHONE ENCOUNTER
Attempted to contact, no answer. Left detailed voicemail relaying provider's message. Patient has viewed via [employer].

## 2022-08-15 NOTE — TELEPHONE ENCOUNTER
Please call patient about new prescription for vitamin D.  I also sent a full results letter to [facility].    The test results show that you continue to have mild anemia but improving.  Normal B12 and iron levels.  Fasting sugar is mildly elevated.  A1c has improved from 6.8 now at goal less than 6.5.  Continue current Trulicity and metformin.  Normal kidney function, electrolytes, and liver function.  Normal thyroid function.  Cholesterol levels have improved greatly with using atorvastatin.  Vitamin D remains borderline decreased.  I will send a prescription for vitamin D to help boost your vitamin D stores in the body for the next 6 months.

## 2022-08-30 ENCOUNTER — TELEPHONE (OUTPATIENT)
Dept: PULMONOLOGY | Facility: CLINIC | Age: 62
End: 2022-08-30

## 2022-09-12 ENCOUNTER — TELEPHONE (OUTPATIENT)
Dept: FAMILY MEDICINE CLINIC | Facility: CLINIC | Age: 62
End: 2022-09-12

## 2022-09-12 DIAGNOSIS — G47.9 SLEEP DISORDER: Primary | ICD-10-CM

## 2022-09-12 NOTE — TELEPHONE ENCOUNTER
Caller: Vivian Galvez    Relationship: Self    Best call back number:  [phone]    What is the best time to reach you: ANY    Who are you requesting to speak with (clinical staff, provider,  specific staff member):  CLINICAL     PATIENT WOULD LIKE A SAVINGS CARD FOR EMGALITY

## 2022-09-12 NOTE — TELEPHONE ENCOUNTER
Caller: Vivian Galvez    Relationship: Self    Best call back number:  [phone]    What specialty or service is being requested:   SLEEP STUDY        Any additional details: PLEASE CALL PATIENT REGARDING REFERRAL FOR A SLEEP STUDY

## 2022-09-27 DIAGNOSIS — E66.9 DIABETES MELLITUS TYPE 2 IN OBESE: ICD-10-CM

## 2022-09-27 DIAGNOSIS — E11.69 DIABETES MELLITUS TYPE 2 IN OBESE: ICD-10-CM

## 2022-09-28 RX ORDER — METFORMIN HYDROCHLORIDE 750 MG/1
750 TABLET, EXTENDED RELEASE ORAL
Qty: 90 TABLET | Refills: 1 | Status: SHIPPED | OUTPATIENT
Start: 2022-09-28 | End: 2022-11-18 | Stop reason: SDUPTHER

## 2022-09-28 RX ORDER — DULAGLUTIDE 3 MG/.5ML
3 INJECTION, SOLUTION SUBCUTANEOUS WEEKLY
Qty: 0.5 ML | Refills: 4 | Status: SHIPPED | OUTPATIENT
Start: 2022-09-28 | End: 2022-11-18

## 2022-09-28 NOTE — TELEPHONE ENCOUNTER
Rx Refill Note  Requested Prescriptions     Pending Prescriptions Disp Refills   • Dulaglutide (Trulicity) 3 MG/0.5ML solution pen-injector       Sig: Inject 0.5 mL under the skin into the appropriate area as directed 1 (One) Time Per Week.   • metFORMIN ER (GLUCOPHAGE-XR) 750 MG 24 hr tablet 90 tablet 1     Sig: Take 1 tablet by mouth Daily With Breakfast.      Last office visit with prescribing clinician: 8/12/2022      Next office visit with prescribing clinician: 1/18/2023            Becki Tinoco MA  09/28/22, 10:24 EDT

## 2022-10-20 RX ORDER — QUINAPRIL HCL AND HYDROCHLOROTHIAZIDE 20; 25 MG/1; MG/1
TABLET ORAL
Qty: 90 TABLET | Refills: 0 | Status: SHIPPED | OUTPATIENT
Start: 2022-10-20 | End: 2022-11-01

## 2022-10-20 NOTE — TELEPHONE ENCOUNTER
Rx Refill Note  Requested Prescriptions     Pending Prescriptions Disp Refills   • quinapril-hydrochlorothiazide (ACCURETIC) 20-25 MG per tablet [Pharmacy Med Name: QUINAPRIL-HCTZ 20-25 MG TAB] 90 tablet 0     Sig: TAKE ONE TABLET BY MOUTH DAILY      Last office visit with prescribing clinician: 8/12/2022      Next office visit with prescribing clinician: 1/18/2023            Irene Murillo MA  10/20/22, 08:32 EDTRx Refill Note

## 2022-10-31 ENCOUNTER — TELEPHONE (OUTPATIENT)
Dept: FAMILY MEDICINE CLINIC | Facility: CLINIC | Age: 62
End: 2022-10-31

## 2022-11-01 RX ORDER — LISINOPRIL AND HYDROCHLOROTHIAZIDE 25; 20 MG/1; MG/1
1 TABLET ORAL DAILY
Qty: 30 TABLET | Refills: 5 | Status: SHIPPED | OUTPATIENT
Start: 2022-11-01 | End: 2022-11-18

## 2022-11-18 ENCOUNTER — OFFICE VISIT (OUTPATIENT)
Dept: FAMILY MEDICINE CLINIC | Facility: CLINIC | Age: 62
End: 2022-11-18

## 2022-11-18 VITALS
HEIGHT: 61 IN | BODY MASS INDEX: 33.34 KG/M2 | HEART RATE: 91 BPM | OXYGEN SATURATION: 99 % | SYSTOLIC BLOOD PRESSURE: 141 MMHG | WEIGHT: 176.6 LBS | DIASTOLIC BLOOD PRESSURE: 78 MMHG

## 2022-11-18 DIAGNOSIS — E11.65 TYPE 2 DIABETES MELLITUS WITH HYPERGLYCEMIA, WITHOUT LONG-TERM CURRENT USE OF INSULIN: Primary | ICD-10-CM

## 2022-11-18 DIAGNOSIS — Z12.31 VISIT FOR SCREENING MAMMOGRAM: ICD-10-CM

## 2022-11-18 DIAGNOSIS — I10 PRIMARY HYPERTENSION: ICD-10-CM

## 2022-11-18 DIAGNOSIS — Z23 NEED FOR VACCINATION: ICD-10-CM

## 2022-11-18 LAB
EXPIRATION DATE: NORMAL
HBA1C MFR BLD: 6.8 %
Lab: NORMAL

## 2022-11-18 PROCEDURE — 90686 IIV4 VACC NO PRSV 0.5 ML IM: CPT | Performed by: FAMILY MEDICINE

## 2022-11-18 PROCEDURE — 90471 IMMUNIZATION ADMIN: CPT | Performed by: FAMILY MEDICINE

## 2022-11-18 PROCEDURE — 0124A COVID-19 (PFIZER) BIVALENT BOOSTER 12+YRS: CPT | Performed by: FAMILY MEDICINE

## 2022-11-18 PROCEDURE — 99214 OFFICE O/P EST MOD 30 MIN: CPT | Performed by: FAMILY MEDICINE

## 2022-11-18 PROCEDURE — 91312 COVID-19 (PFIZER) BIVALENT BOOSTER 12+YRS: CPT | Performed by: FAMILY MEDICINE

## 2022-11-18 PROCEDURE — 83036 HEMOGLOBIN GLYCOSYLATED A1C: CPT | Performed by: FAMILY MEDICINE

## 2022-11-18 RX ORDER — HYDROCHLOROTHIAZIDE 25 MG/1
25 TABLET ORAL DAILY
Qty: 90 TABLET | Refills: 1 | Status: SHIPPED | OUTPATIENT
Start: 2022-11-18 | End: 2023-01-18 | Stop reason: ALTCHOICE

## 2022-11-18 RX ORDER — LISINOPRIL 40 MG/1
40 TABLET ORAL DAILY
Qty: 90 TABLET | Refills: 1 | Status: SHIPPED | OUTPATIENT
Start: 2022-11-18 | End: 2023-01-18 | Stop reason: ALTCHOICE

## 2022-11-18 RX ORDER — METFORMIN HYDROCHLORIDE 750 MG/1
750 TABLET, EXTENDED RELEASE ORAL 2 TIMES DAILY
Qty: 180 TABLET | Refills: 1 | Status: SHIPPED | OUTPATIENT
Start: 2022-11-18 | End: 2023-01-18

## 2022-11-18 RX ORDER — DULAGLUTIDE 4.5 MG/.5ML
4.5 INJECTION, SOLUTION SUBCUTANEOUS WEEKLY
Qty: 6 ML | Refills: 1 | Status: SHIPPED | OUTPATIENT
Start: 2022-11-18 | End: 2023-03-14 | Stop reason: SDUPTHER

## 2022-11-18 RX ORDER — LISINOPRIL AND HYDROCHLOROTHIAZIDE 25; 20 MG/1; MG/1
1 TABLET ORAL DAILY
Qty: 90 TABLET | Refills: 1 | Status: CANCELLED | OUTPATIENT
Start: 2022-11-18
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
## 2022-12-06 DIAGNOSIS — E78.5 DYSLIPIDEMIA: ICD-10-CM

## 2022-12-06 NOTE — TELEPHONE ENCOUNTER
Caller: Vivian Galvez    Relationship: Self    Best call back number: [phone]    Requested Prescriptions:   Requested Prescriptions     Pending Prescriptions Disp Refills   • atorvastatin (LIPITOR) 20 MG tablet 90 tablet 3     Sig: Take 1 tablet by mouth Every Night.        Pharmacy where request should be sent: [facility] [id number] [id number] [facility] [address] - [phone]  - [phone]     Additional details provided by patient: PATIENT ALSO STATES THAT SHE NEEDS B12 INJECTIONS.    Does the patient have less than a 3 day supply:  [x] Yes  [] No    Would you like a call back once the refill request has been completed: [x] Yes [] No    If the office needs to give you a call back, can they leave a voicemail: [x] Yes [] No    Kendra Prescott Rep   12/06/22 10:08 EST

## 2022-12-07 RX ORDER — ATORVASTATIN CALCIUM 20 MG/1
20 TABLET, FILM COATED ORAL NIGHTLY
Qty: 90 TABLET | Refills: 3 | Status: SHIPPED | OUTPATIENT
Start: 2022-12-07

## 2022-12-15 ENCOUNTER — PRIOR AUTHORIZATION (OUTPATIENT)
Dept: FAMILY MEDICINE CLINIC | Facility: CLINIC | Age: 62
End: 2022-12-15

## 2022-12-15 DIAGNOSIS — E78.5 DYSLIPIDEMIA: ICD-10-CM

## 2022-12-15 DIAGNOSIS — G43.009 MIGRAINE WITHOUT AURA AND WITHOUT STATUS MIGRAINOSUS, NOT INTRACTABLE: ICD-10-CM

## 2022-12-15 RX ORDER — GALCANEZUMAB 120 MG/ML
INJECTION, SOLUTION SUBCUTANEOUS
Qty: 3 ML | Refills: 0 | Status: SHIPPED | OUTPATIENT
Start: 2022-12-15

## 2022-12-15 NOTE — TELEPHONE ENCOUNTER
Completed Prior Auth for Emgality    KEY:[id number]  Awaiting determination from [facility]

## 2022-12-19 RX ORDER — CYANOCOBALAMIN 1000 UG/ML
1000 INJECTION, SOLUTION INTRAMUSCULAR; SUBCUTANEOUS
Qty: 10 ML | Refills: 1 | Status: SHIPPED | OUTPATIENT
Start: 2022-12-19

## 2022-12-19 RX ORDER — ATORVASTATIN CALCIUM 20 MG/1
20 TABLET, FILM COATED ORAL NIGHTLY
Qty: 90 TABLET | Refills: 3 | OUTPATIENT
Start: 2022-12-19

## 2022-12-19 NOTE — TELEPHONE ENCOUNTER
Cyanocobalamin 1000 mcg/ml injection has not been prescribed by this office    Rx Refill Note  Requested Prescriptions     Pending Prescriptions Disp Refills   • cyanocobalamin 1000 MCG/ML injection     • atorvastatin (LIPITOR) 20 MG tablet 90 tablet 3     Sig: Take 1 tablet by mouth Every Night.      Last office visit with prescribing clinician: 11/18/2022   Last telemedicine visit with prescribing clinician: 1/18/2023   Next office visit with prescribing clinician: 1/18/2023                         Would you like a call back once the refill request has been completed: [] Yes [] No    If the office needs to give you a call back, can they leave a voicemail: [] Yes [] No    Kennedy Pimentel MA  12/19/22, 08:03 EST     Atorvastatin 20 mg sent 12/7/22 #90 with 3 rfs

## 2022-12-29 NOTE — TELEPHONE ENCOUNTER
Emgality 120mg/mL Pen Auto-Inj ([id number]). You can now fill your prescription, and it will be covered according to your plan.  As long as you remain covered by your prescription drug plan and there are no changes to your  plan benefits, this request is approved from 12/15/2022 to 03/15/2023.

## 2023-01-18 ENCOUNTER — OFFICE VISIT (OUTPATIENT)
Dept: FAMILY MEDICINE CLINIC | Facility: CLINIC | Age: 63
End: 2023-01-18
Payer: COMMERCIAL

## 2023-01-18 VITALS
OXYGEN SATURATION: 97 % | SYSTOLIC BLOOD PRESSURE: 140 MMHG | DIASTOLIC BLOOD PRESSURE: 70 MMHG | BODY MASS INDEX: 32.21 KG/M2 | WEIGHT: 170.6 LBS | HEART RATE: 86 BPM | HEIGHT: 61 IN

## 2023-01-18 DIAGNOSIS — I10 PRIMARY HYPERTENSION: ICD-10-CM

## 2023-01-18 DIAGNOSIS — E78.5 DYSLIPIDEMIA: ICD-10-CM

## 2023-01-18 DIAGNOSIS — E11.9 TYPE 2 DIABETES MELLITUS WITHOUT COMPLICATION, WITHOUT LONG-TERM CURRENT USE OF INSULIN: ICD-10-CM

## 2023-01-18 DIAGNOSIS — Z23 NEED FOR VACCINATION: ICD-10-CM

## 2023-01-18 DIAGNOSIS — Z12.31 VISIT FOR SCREENING MAMMOGRAM: ICD-10-CM

## 2023-01-18 DIAGNOSIS — F90.9 ATTENTION DEFICIT HYPERACTIVITY DISORDER (ADHD), UNSPECIFIED ADHD TYPE: ICD-10-CM

## 2023-01-18 DIAGNOSIS — F33.2 SEVERE EPISODE OF RECURRENT MAJOR DEPRESSIVE DISORDER, WITHOUT PSYCHOTIC FEATURES: ICD-10-CM

## 2023-01-18 DIAGNOSIS — Z00.00 WELL ADULT EXAM: Primary | ICD-10-CM

## 2023-01-18 PROCEDURE — 99396 PREV VISIT EST AGE 40-64: CPT | Performed by: FAMILY MEDICINE

## 2023-01-18 PROCEDURE — 99214 OFFICE O/P EST MOD 30 MIN: CPT | Performed by: FAMILY MEDICINE

## 2023-01-18 RX ORDER — LISINOPRIL 40 MG/1
40 TABLET ORAL DAILY
Qty: 90 TABLET | Refills: 1 | Status: SHIPPED | OUTPATIENT
Start: 2023-01-18

## 2023-01-18 RX ORDER — LAMOTRIGINE 200 MG/1
1 TABLET ORAL DAILY
COMMUNITY
Start: 2022-12-12 | End: 2023-04-04 | Stop reason: SDUPTHER

## 2023-01-18 RX ORDER — LISINOPRIL AND HYDROCHLOROTHIAZIDE 25; 20 MG/1; MG/1
1 TABLET ORAL DAILY
COMMUNITY
Start: 2022-12-29 | End: 2023-01-18

## 2023-01-18 RX ORDER — METFORMIN HYDROCHLORIDE 750 MG/1
750 TABLET, EXTENDED RELEASE ORAL
COMMUNITY

## 2023-01-18 RX ORDER — HYDROCHLOROTHIAZIDE 25 MG/1
25 TABLET ORAL DAILY
Qty: 90 TABLET | Refills: 1 | Status: SHIPPED | OUTPATIENT
Start: 2023-01-18

## 2023-01-18 RX ORDER — ERGOCALCIFEROL 1.25 MG/1
1 CAPSULE ORAL WEEKLY
COMMUNITY
Start: 2022-12-14

## 2023-01-18 NOTE — PROGRESS NOTES
Chief Complaint  well adult exam and Annual Exam    Subjective          Vivian Galvez presents to [facility] for   History of Present Illness     She has not scheduled mammogram yet and would prefer closer to home.    She had talked to pharmacist about shingles vaccine but told she did not need it at her age.    She has no dental problems and has had regular dental exams.    She hasn't felt well for the last week. Nausea, diarrhea, fatigue, shaky. No known sick contacts. BM sometimes one or two, usually in the morning and after eating. Indigestion. No vomiting. Eating saltine crackers. Drinking water. No abdominal pain, fevers, or chills. She has h/o diarrhea off and on. Metformin makes diarrhea worse since increasing to twice a day.     She has 3 BP medications, one with lisinopril and two with HCTZ.      She had trouble getting Trulicity prescription and on back order. She was without medication for 2 weeks. Currently taking 3mg.     She is out of atorvastatin.     She had difficulty getting in to see psychiatrist. She is tired all the time, not wanting to leave the house, trouble concentrating.     PHQ-2/PHQ-9 Depression Screening 1/18/2023   Retired PHQ-9 Total Score -   Retired Total Score -   Little Interest or Pleasure in Doing Things 3-->nearly every day   Feeling Down, Depressed or Hopeless 3-->nearly every day   Trouble Falling or Staying Asleep, or Sleeping Too Much 3-->nearly every day   Feeling Tired or Having Little Energy 3-->nearly every day   Poor Appetite or Overeating 3-->nearly every day   Feeling Bad about Yourself - or that You are a Failure or Have Let Yourself or Your Family Down 2-->more than half the days   Trouble Concentrating on Things, Such as Reading the Newspaper or Watching Television 1-->several days   Moving or Speaking So Slowly that Other People Could Have Noticed? Or the Opposite - Being So Fidgety 0-->not at all   Thoughts that You Would be Better  "Off Dead or of Hurting Yourself in Some Way 0-->not at all   PHQ-9: Brief Depression Severity Measure Score 18   If You Checked Off Any Problems, How Difficult Have These Problems Made It For You to Do Your Work, Take Care of Things at Home, or Get Along with Other People? very difficult       Objective   Vital Signs:   Vitals:    01/18/23 1347   BP: 140/70   Pulse: 86   SpO2: 97%   Weight: 77.4 kg (170 lb 9.6 oz)   Height: 154.9 cm (60.98\")     Body mass index is 32.25 kg/m².    BMI is >= 30 and <35. (Class 1 Obesity). The following options were offered after discussion;: Patient with current GI symptoms and change in diet to bland foods.          Physical Exam  Constitutional:       General: She is not in acute distress.     Appearance: She is obese.   HENT:      Right Ear: Tympanic membrane and ear canal normal.      Left Ear: Tympanic membrane and ear canal normal.   Eyes:      General:         Right eye: No discharge.         Left eye: No discharge.      Conjunctiva/sclera: Conjunctivae normal.   Neck:      Thyroid: No thyromegaly.   Cardiovascular:      Rate and Rhythm: Normal rate and regular rhythm.   Pulmonary:      Effort: Pulmonary effort is normal.      Breath sounds: Normal breath sounds.   Abdominal:      General: Bowel sounds are normal.      Palpations: Abdomen is soft. There is no hepatomegaly or splenomegaly.      Tenderness: There is no abdominal tenderness.      Hernia: No hernia is present.   Musculoskeletal:      Cervical back: Neck supple.   Lymphadenopathy:      Head:      Right side of head: No submandibular, preauricular or posterior auricular adenopathy.      Left side of head: No submandibular, preauricular or posterior auricular adenopathy.      Cervical: No cervical adenopathy.   Skin:     General: Skin is warm.   Neurological:      Mental Status: She is alert and oriented to person, place, and time.   Psychiatric:         Mood and Affect: Mood is depressed.         Behavior: Behavior " normal.         Thought Content: Thought content normal. Thought content does not include suicidal ideation.         Judgment: Judgment normal.        Result Review :                Immunization History   Administered Date(s) Administered   • COVID-19 (MODERNA) 1st, 2nd, 3rd Dose Only 01/28/2021, 02/25/2021, 12/17/2021   • COVID-19 (PFIZER) BIVALENT BOOSTER 12+YRS 11/18/2022   • FluLaval/Fluzone >6mos 11/01/2021, 11/18/2022   • Hepatitis A 11/13/2019   • Pneumococcal Conjugate 20-Valent (PCV20) 08/12/2022       Health Maintenance   Topic Date Due   • MAMMOGRAM  Never done   • TDAP/TD VACCINES (1 - Tdap) Never done   • ZOSTER VACCINE (1 of 2) Never done   • DIABETIC FOOT EXAM  Never done   • HEMOGLOBIN A1C  02/18/2023   • DIABETIC EYE EXAM  07/11/2023   • URINE MICROALBUMIN  08/12/2023   • PAP SMEAR  01/18/2027   • INFLUENZA VACCINE  Completed            Assessment and Plan    Diagnoses and all orders for this visit:    1. Well adult exam (Primary)  Colon cancer screening colonoscopy current and due at 5-year intervals due to history of polyps.  Cervical cancer screening Pap smear normal and HPV - January 2022.  Diabetic eye exam July 2022.  2. Need for vaccination  Recommend Shingrix vaccine at local pharmacy.  3. Visit for screening mammogram  -     Mammo Screening Digital Tomosynthesis Bilateral With CAD; Future  Changed mammogram ordered to [place] which is closer to her home.  4. Primary hypertension  -     lisinopril (PRINIVIL,ZESTRIL) 40 MG tablet; Take 1 tablet by mouth Daily.  Dispense: 90 tablet; Refill: 1  -     hydroCHLOROthiazide (HYDRODIURIL) 25 MG tablet; Take 1 tablet by mouth Daily.  Dispense: 90 tablet; Refill: 1  Pharmacy contacted about recent fill and medications.  October Quinapril-HTCZ (got recalled) nothing in November, December lisinopril 40 and HTCZ 12.5, Jan Lis-HTCZ 20-12.5.  At this time cancel lisinopril 20 hydrochlorothiazide 12.5.  Renewed prescription for lisinopril 40 and  hydrochlorothiazide 25.  Written instructions also provided.   5. Dyslipidemia  LDL goal less than 100.  Continue statin.  6. Type 2 diabetes mellitus without complication, without long-term current use of insulin (HCC)  Patient has had supply chain issues receiving Trulicity.  I have concerned that her GI symptoms of nausea are related to restarting Trulicity at 3 mg after being off for 2 weeks.  I have provided her with sample of 0.75 mg to take for 2 weeks, then increase to sample 1.5 mg for 2 weeks, and then to resume 3 mg dose.  I have also instructed her to decrease metformin from twice daily down to once daily dosing.  Advised that she may experience hyperglycemia while decreasing her GLP-1 a and hopefully will see improvement in the near future.  Defer A1c until March.  7. Severe episode of recurrent major depressive disorder, without psychotic features (HCC)  -     Ambulatory Referral to Behavioral Health  She has difficulty getting in with her psychiatrist.  I have placed a referral for [facility] behavioral health to reach out and schedule for her for medication management.  Patient screened positive for depression based on a PHQ-9 score of 18 on 1/18/2023. Follow-up recommendations include: Referral to Mental Health specialist.    8. Attention deficit hyperactivity disorder (ADHD), unspecified ADHD type  -     Ambulatory Referral to Behavioral Health  She has difficulty getting in with her psychiatrist.  I have placed a referral for [facility] behavioral health to reach out and schedule for her for medication management.      Counseling/anticipatory guidance: dental health, mental health, eye exam, immunizations, screenings      Follow Up   Return in about 6 weeks (around 3/1/2023) for Office visit diabetes, HTN.  Patient was given instructions and counseling regarding her condition or for health maintenance advice. Please see specific information pulled into the AVS if appropriate.       Electronically signed by Peyton Arechiga MD, 01/18/23, 2:51 PM EST.

## 2023-01-18 NOTE — PATIENT INSTRUCTIONS
For diabetes I am concerned that the metformin increase is associated with diarrhea.  At this time plan to decrease back to previous dose of metformin 1 pill/day.  I am also concerned that the Trulicity injection has led to nausea.  I would like to start at the beginning with Trulicity to 0.75 mg sample once a week for 2 doses.  Then increase to the Trulicity 1.5 mg sample once a week for 2 doses.  Then resume the Trulicity 3 mg once a week dose.    I am concerned about your blood pressure medications with refilled incorrectly.  My prescriptions are for lisinopril 40 mg once a day and hydrochlorothiazide 25 mg once a day.  You will no longer be taking lisinopril 20-hydrochlorothiazide 25.  The staff has called the pharmacy to cancel this prescription so you will not receive future refills by mistake.

## 2023-02-14 ENCOUNTER — PATIENT MESSAGE (OUTPATIENT)
Dept: FAMILY MEDICINE CLINIC | Facility: CLINIC | Age: 63
End: 2023-02-14
Payer: COMMERCIAL

## 2023-02-17 RX ORDER — DEXTROAMPHETAMINE SACCHARATE, AMPHETAMINE ASPARTATE MONOHYDRATE, DEXTROAMPHETAMINE SULFATE AND AMPHETAMINE SULFATE 5; 5; 5; 5 MG/1; MG/1; MG/1; MG/1
20 CAPSULE, EXTENDED RELEASE ORAL EVERY MORNING
OUTPATIENT
Start: 2023-02-17

## 2023-02-17 NOTE — TELEPHONE ENCOUNTER
Rx Refill Note  Requested Prescriptions     Pending Prescriptions Disp Refills   • amphetamine-dextroamphetamine XR (ADDERALL XR) 20 MG 24 hr capsule       Sig: Take 1 capsule by mouth Every Morning      Last office visit with prescribing clinician: 1/18/2023   Last telemedicine visit with prescribing clinician: 3/3/2023   Next office visit with prescribing clinician: 3/3/2023                         Would you like a call back once the refill request has been completed: [] Yes [] No    If the office needs to give you a call back, can they leave a voicemail: [] Yes [] No    Kennedy Pimentel MA  02/17/23, 12:47 EST

## 2023-02-17 NOTE — TELEPHONE ENCOUNTER
Refill request for Adderall denied.  She needs to see psychiatry at behavioral health.  She has an appointment on March 7.

## 2023-02-17 NOTE — TELEPHONE ENCOUNTER
Lvm to call office back.    HUB CAN RELY MESSAGE AND SCHEDULE   [facility] Clinical [facility] 1 hour ago (1:07 PM)     Peyton Arechiga MD 1 hour ago (1:07 PM)       Refill request for Adderall denied.  She needs to see psychiatry at behavioral [facility].  She has an appointment on March 7.         Note

## 2023-02-21 NOTE — PROGRESS NOTES
Chief Complaint  Sleeping Problem    Subjective     History of Present Illness:  Vivian Galvez is a 63 y.o. female with a history of HTN, HL, DM, anxiety, depression, migraines, and GERD.  The patient is referred by Peyton Arechiga MD. patient has had difficulty with daytime sleepiness and fatigue, per her questionnaire the patient reports snoring, daytime sleepiness and fatigue, morning headaches, nonrestorative sleep, nasal allergies, difficulty staying asleep.  This has been ongoing for more than 5 years.  Patient typically goes to bed at 10 PM waking at 8 AM on weekdays, and 11 PM waking at 9 AM on weekends.  She estimates an average of 6 hours of sleep per night.  It takes her approximately 30 minutes to get to sleep.  She does take 30-minute naps.  She denies use of tobacco, alcohol, or illicit drugs.  The patient drinks 1 regular cup of coffee, 1 glass of regular tea, and 1 can regular cola daily.    Further details are as follows:    [place] Scale is (out of 24): Total score: 8     Estimated average amount of sleep per night: 6  Number of times she wakes up at night: 3- to use the restroom  Difficulty falling back asleep: yes  It usually takes 30 minutes to go to sleep.  She feels sleepy upon waking up: yes  Rotating or night shift work: no    Drowsiness/Sleepiness:  She exhibits the following:  excessive daytime sleepiness  excessive daytime fatigue  difficulty driving due to sleepiness  takes scheduled naps during the day    Snoring/Breathing:  She exhibits the following:  loud snoring, snores in all sleep positions, quits breathing at night, awakens with dry mouth, sore throat when waking up in the morning and morning headaches    Head Injury:  She exhibits the following:  No    Reflux:  She describes the following:  wakes up at night with a sour taste or burning sensation in chest    Narcolepsy:  She exhibits the following:  none    RLS/PLMs:  She describes the following:  none    Insomnia:  She  describes the following:  frequent awakenings    Parasomnia:  She exhibits the following:  none    Weight:       02/22/23  1357   Weight: 77.3 kg (170 lb 6.4 oz)      Weight change in the last year:  Consistent    The patient's relevant past medical, surgical, family, and social history reviewed and updated in Epic as appropriate.    Review of Systems    PMH:    Past Medical History:   Diagnosis Date   • Colon polyp    • Depression    • Diabetes mellitus (HCC)    • Dyslipidemia    • Gall stones    • Gallstone pancreatitis    • GERD (gastroesophageal reflux disease)    • Gout    • Hypertension    • Migraines    • Vitamin D deficiency      Past Surgical History:   Procedure Laterality Date   • CHOLECYSTECTOMY  1986   • TONSILLECTOMY     • WISDOM TOOTH EXTRACTION       OB History    No obstetric history on file.       No Known Allergies    MEDS:  Prior to Admission medications    Medication Sig Start Date End Date Taking? Authorizing Provider   amphetamine-dextroamphetamine XR (ADDERALL XR) 20 MG 24 hr capsule Take 20 mg by mouth Every Morning    Beau Lowe MD   atorvastatin (LIPITOR) 20 MG tablet Take 1 tablet by mouth Every Night. 12/7/22   Peyton Arechiga MD   cyanocobalamin 1000 MCG/ML injection Inject 1 mL under the skin into the appropriate area as directed Every 30 (Thirty) Days. 12/19/22   Peyton Arechiga MD   Dulaglutide (Trulicity) 4.5 MG/0.5ML solution pen-injector Inject 0.5 mL under the skin into the appropriate area as directed 1 (One) Time Per Week. 11/18/22   Peyton Arechiga MD   DULoxetine (CYMBALTA) 60 MG capsule Take 60 mg by mouth Daily.    Beau Lowe MD   Emgality 120 MG/ML auto-injector pen INJECT ONE MILLILITER UNDER THE SKIN INTO THE APPROPRIATE AREA EVERY 30 DAYS AS DIRECTED 12/15/22   Peyton Arechiga MD   glycopyrrolate (ROBINUL) 2 MG tablet Take 2 mg by mouth 2 (Two) Times a Day.    Beau Lowe MD   hydroCHLOROthiazide (HYDRODIURIL) 25 MG tablet  "Take 1 tablet by mouth Daily. 1/18/23   Peyton Arechiga MD   lamoTRIgine (LaMICtal) 200 MG tablet Take 1 tablet by mouth Daily. 12/12/22   Beau Lowe MD   lisinopril (PRINIVIL,ZESTRIL) 40 MG tablet Take 1 tablet by mouth Daily. 1/18/23   Peyton Arechiga MD   metFORMIN ER (GLUCOPHAGE-XR) 750 MG 24 hr tablet Take 750 mg by mouth Daily With Breakfast.    Beau Lowe MD   Needle, Disp, (BD Disp Needles) 21G X 1-1/2\" misc 1 each Every 30 (Thirty) Days. 4/12/22   Peyton Arechiga MD   Syringe 20G X 1\" 3 ML misc 1 each Daily. 4/12/22   Peyton Arechiga MD   valACYclovir (Valtrex) 500 MG tablet Take 1 tablet by mouth 2 (Two) Times a Day. For 3 days per episode 5/18/22   Peyton Arechiga MD   vitamin D (ERGOCALCIFEROL) 1.25 MG (74343 UT) capsule capsule Take 1 capsule by mouth 1 (One) Time Per Week. 12/14/22   Beau Lowe MD       FH:  Family History   Problem Relation Age of Onset   • Breast cancer Mother    • Hypertension Mother    • Diabetes Paternal Grandmother    • Anxiety disorder Daughter    • Depression Daughter    • Thalassemia Daughter    • Hashimoto's thyroiditis Daughter    • Neuropathy Daughter    • Goiter Daughter    • Eczema Daughter        Objective   Vital Signs:  /55 (BP Location: Left arm, Patient Position: Sitting)   Pulse 85   Ht 154.9 cm (61\")   Wt 77.3 kg (170 lb 6.4 oz)   SpO2 96%   BMI 32.20 kg/m²           Physical Exam  Vitals reviewed.   Constitutional:       Appearance: Normal appearance.   HENT:      Head: Normocephalic and atraumatic.      Nose: Nose normal.      Mouth/Throat:      Mouth: Mucous membranes are moist.   Cardiovascular:      Rate and Rhythm: Normal rate and regular rhythm.      Heart sounds: No murmur heard.    No friction rub. No gallop.   Pulmonary:      Effort: Pulmonary effort is normal. No respiratory distress.      Breath sounds: Normal breath sounds. No wheezing or rhonchi.   Neurological:      Mental Status: She is " alert and oriented to person, place, and time.   Psychiatric:         Behavior: Behavior normal.       Mallampati Score: III (soft and hard palate and base of uvula visible)    Result Review :              Assessment and Plan  Vivian Galvez is a 63 y.o. female who presents for further evaluation of excessive daytime and sleepiness and fatigue, nonrestorative sleep, and concerns for sleep disordered breathing and obstructive sleep apnea.  We will obtain a home sleep test for further evaluation.  The patient will return for follow-up and recommendations after test.  I have discussed weight loss as it pertains to obstructive sleep apnea.    Diagnoses and all orders for this visit:    1. Snoring (Primary)  -     Home Sleep Study; Future    2. Suspected sleep apnea  -     Home Sleep Study; Future    3. Excessive daytime sleepiness  -     Home Sleep Study; Future               I discussed the consequences of uncontrolled sleep apnea including hypertension, heart disease, diabetes, stroke, and dementia. I further discussed sleep apnea therapeutic options including CPAP, Weight loss, Oral dental appliance, and surgery.         Follow Up  Return for Follow up after study.  Patient was given instructions and counseling regarding her condition or for health maintenance advice. Please see specific information pulled into the AVS if appropriate.     YARY Ogden, ACNP-BC  Pulmonology, Critical Care, and Sleep Medicine

## 2023-02-22 ENCOUNTER — OFFICE VISIT (OUTPATIENT)
Dept: SLEEP MEDICINE | Facility: HOSPITAL | Age: 63
End: 2023-02-22
Payer: COMMERCIAL

## 2023-02-22 VITALS
OXYGEN SATURATION: 96 % | BODY MASS INDEX: 32.17 KG/M2 | HEART RATE: 85 BPM | SYSTOLIC BLOOD PRESSURE: 116 MMHG | WEIGHT: 170.4 LBS | DIASTOLIC BLOOD PRESSURE: 55 MMHG | HEIGHT: 61 IN

## 2023-02-22 DIAGNOSIS — G47.19 EXCESSIVE DAYTIME SLEEPINESS: ICD-10-CM

## 2023-02-22 DIAGNOSIS — R06.83 SNORING: Primary | ICD-10-CM

## 2023-02-22 DIAGNOSIS — R29.818 SUSPECTED SLEEP APNEA: ICD-10-CM

## 2023-02-22 PROCEDURE — 99203 OFFICE O/P NEW LOW 30 MIN: CPT | Performed by: NURSE PRACTITIONER

## 2023-03-06 DIAGNOSIS — E53.8 B12 DEFICIENCY: ICD-10-CM

## 2023-03-06 RX ORDER — NEEDLES, DISPOSABLE 25GX5/8"
1 NEEDLE, DISPOSABLE MISCELLANEOUS
Qty: 10 EACH | Refills: 2 | Status: SHIPPED | OUTPATIENT
Start: 2023-03-06 | End: 2023-03-14

## 2023-03-07 ENCOUNTER — TELEMEDICINE (OUTPATIENT)
Dept: PSYCHIATRY | Facility: CLINIC | Age: 63
End: 2023-03-07
Payer: COMMERCIAL

## 2023-03-07 DIAGNOSIS — F41.1 GENERALIZED ANXIETY DISORDER: Chronic | ICD-10-CM

## 2023-03-07 DIAGNOSIS — F33.1 MAJOR DEPRESSIVE DISORDER, RECURRENT EPISODE, MODERATE: Primary | Chronic | ICD-10-CM

## 2023-03-07 PROCEDURE — 90792 PSYCH DIAG EVAL W/MED SRVCS: CPT | Performed by: NURSE PRACTITIONER

## 2023-03-07 RX ORDER — BUPROPION HYDROCHLORIDE 150 MG/1
TABLET ORAL
Qty: 30 TABLET | Refills: 1 | Status: SHIPPED | OUTPATIENT
Start: 2023-03-07 | End: 2023-04-04 | Stop reason: DRUGHIGH

## 2023-03-07 NOTE — TREATMENT PLAN
Multi-Disciplinary Problems (from Behavioral Health Treatment Plan)    Active Problems     Problem: Anxiety  Start Date: 03/07/23    Problem Details: The patient self-scales this problem as a 7 with 10 being the worst.        Goal Priority Start Date Expected End Date End Date    Patient will develop and implement behavioral and cognitive strategies to reduce anxiety and irrational fears. -- 03/07/23 -- --    Goal Details: Progress toward goal:  Not appropriate to rate progress toward goal since this is the initial treatment plan.        Goal Intervention Frequency Start Date End Date    Help patient explore past emotional issues in relation to present anxiety. Q Month 03/07/23 --    Intervention Details: Duration of treatment until until discharged.        Goal Intervention Frequency Start Date End Date    Help patient develop an awareness of their cognitive and physical responses to anxiety. Q Month 03/07/23 --    Intervention Details: Duration of treatment until until discharged.              Problem: Depression  Start Date: 03/07/23    Problem Details: The patient self-scales this problem as a 7 with 10 being the worst.        Goal Priority Start Date Expected End Date End Date    Patient will demonstrate the ability to initiate new constructive life skills outside of sessions on a consistent basis. -- 03/07/23 -- --    Goal Details: Progress toward goal:  Not appropriate to rate progress toward goal since this is the initial treatment plan.        Goal Intervention Frequency Start Date End Date    Assist patient in setting attainable activities of daily living goals. PRN 03/07/23 --    Goal Intervention Frequency Start Date End Date    Provide education about depression Q Month 03/07/23 --    Intervention Details: Duration of treatment until until discharged.        Goal Intervention Frequency Start Date End Date    Assist patient in developing healthy coping strategies. Q Month 03/07/23 --    Intervention  Details: Duration of treatment until until discharged.                           I have discussed and reviewed this treatment plan with the patient and/or guardian.  The patient has verbally agreed with this treatment plan (no signatures are obtained at today's visit as the patient is a telehealth patient and is unable to print and sign this document, therefore verbal agreement is obtained).

## 2023-03-07 NOTE — PROGRESS NOTES
"This provider is located at the [facility] (through [facility]), [address], [facility], [address] using a secure [employer] Video Visit through [employer]. Patient is being seen remotely via telehealth in [place], and stated they are in a secure environment for this session. The patient's condition being diagnosed/treated is appropriate for telemedicine. The provider identified herself as well as her credentials.   The patient, and/or patients guardian, consent to be seen remotely, and when consent is given they understand that the consent allows for patient identifiable information to be sent to a third party as needed.   They may refuse to be seen remotely at any time. The electronic data is encrypted and password protected, and the patient and/or guardian has been advised of the potential risks to privacy not withstanding such measures.    You have chosen to receive care through a telehealth visit.  Do you consent to use a video/audio connection for your medical care today? Yes    Patient identifiers utilized: Name and date of birth.          Pablo Galvez is a 63 y.o. female who presents today for initial evaluation     Chief Complaint:  Depression and anxiety    Accompanied by:Pt was alone for duration of appointment    History of Present Illness:   \"I really need a change. I've been on my current medication too long and it's not helping me anymore.\" Per pt, the first time she experienced depression was following the birth of her first daughter in 1990; today it would be referred to as postpartum depression. Pt states there is almost always a precipitant to her recurrent depressive episodes. In May 2022, she was forced into half-way. Pt was a  with a tenor. However, she was removed from her position and placed into one she was uncomfortable with. Pt felt blindsided by it and continues to struggle with the loss. Pt reports feels \"devastated\" " over it. Per pt, she never felt the principal was fond of her because she is more of an introvert. Pt worked for the public school system for 27 years and most of her friends are there. She feels as if she has lost her identity and self-confidence. Pt was an avid reader and she has lost all interest in it. Pt knows she needs to start a new job and meet new people, but is unsure if she will be able to and wants to be in a better mindset first. She also doesn't adjust well to change. Pt states her parents weren't the type to shower her and her siblings with attention and love growing up. Pt admits to having low self-esteem all her life. Pt lacks confidence and believes she is a bother or a burden to others. Pt  her high school sweetheart and after 30 years he decided he didn't want to be  any longer. Pt states her heart was broken by the divorce in 2011. The patient endorses significant symptoms of depression including: changes in sleep, reduced interests in activities, feelings of guilt, changes in energy level, difficulty with concentration, change in appetite, feelings of worthlessness, anger/irritability and decrease in social activity which have caused impairment in important areas of daily functioning. The patient rates their depression on average in the past week at a 7/10 on a 0-10 scale, with 10 being the worst. Pt has been feeling this current episode of depression for the past year. Pt reports anxiety has always been present throughout her life. Pt began having panic attacks in 1995 and sought treatment from a PCP. Pt states she had three young children, they were broke despite her  working all the time, they didn't live close to family, and it was more than she could handle at the time. Pt admits that she worries excessively over everyday, routine life circumstances. Pt is self-conscious and worries what others think. She stresses over things that are out of her control. The patient  endorses significant symptoms of anxiety including: excessive anxiety and worry about a number of events or activities for more days than not, restlessness or feeling keyed up, being easily fatigued, difficulty concentrating or mind going blank, irritability, muscle tension, sleep disturbance, catastrophic thinking and overanalyzes which have caused impairment in important areas of daily functioning. The patient rates their anxiety on average in the past week at a 7/10 on a 0-10 scale, with 10 being the worst.    Current Psychiatric Medications:  Lamictal 200 mg PO Daily - pt has been on for about ten years, it is beneficial   Cymbalta 120 mg PO Daily - pt has been on it for ten year or longer; it helped when she initially started it  Adderall XR 20 mg PO QAM - pt has two weeks left of this medication     Prior Psychiatric Medications:  Lamictal   Cymbalta  Adderall XR - was prescribed for concentration and fatigue; helped with mood and energy level  Prozac - helpful for about five years  Effexor XR - weight gain and didn't help as much     Currently in Counseling or Therapy:  Denies    Prior Psychiatric Outpatient Care:  Around 1995, she began having panic attacks. Pt's PCP prescribed pt Prozac, which was beneficial.   Pt has been in counseling several times in the past; she has had to stop each time due to finances.   Pt had been seeing BHARATHI Kelly, with [facility] in [place], [place], for the past six years until they no longer accepted her insurance. Pt recently seen her for the last time.     Prior Psychiatric Hospitalizations:  Denies    Previous Suicide Attempts:  Pt has never attempted suicide and denies ever having thoughts.     Previous Self-Harming Behavior:  Denies    Any family history of suicide attempts:  Denies    Legal History, Arrests, or Incarcerations:  Denies    Violent Tendencies:  Denies    Developmental History:  The patient reports they were the result of a full-term  "pregnancy.  The patient reports they met all of their developmental milestones as expected.  The patient denies knowledge of the biological mother using alcohol or illicit/recreational substances during the pregnancy with the patient.    History of Seizures or TBI:  Denies    Highest Level of Education:  Two years of college. 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 Pt was a . She had tenor but was taken from her position and placed into one she was uncomfortable with. Pt states she was blindsided by it and continues to struggled with it. Pt states she is \"devastated\" over it. Pt states the principal wasn't fond of her because she is more of an introvert. 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 Pt feels as if she has lost her identity and all her friends were there. Pt reports she has lost her confidence.      History:  Denies    Social History:  Born: [place], [place]   Marriage status:  x1 for 30 years to her high school Pending sale to [facility]. They  in 2011; pt was heartbroken when he no longer wanted to be . Pt had to \"recreate herself\". Currently single and has no desire  Children: Three daughters (40, 36, and 33 YO). Pt has a good relationship with them. Pt has two grandchildren (granddaughter is 7 and grandson is 3 YO). Pt states they are what she lives for.   Lives with: The patient's currently household consists of the patient, two cats. Pt also cares for a lot of stray cats.     Substance Use History:  Denies    Abuse History:  Psychological: Denies  Physical: Denies  Sexual: Denies  Other: Denies    Patient's Support Network Includes:  daughters, friends          The following portions of the patient's history were reviewed and updated as appropriate: allergies, current medications, past family history, past medical history, past social history, past surgical history and problem " list.          Past Medical History:  Past Medical History:   Diagnosis Date   • Colon polyp    • Depression    • Diabetes mellitus (HCC)    • Dyslipidemia    • Gall stones    • Gallstone pancreatitis    • GERD (gastroesophageal reflux disease)    • Gout    • Hypertension    • Migraines    • Vitamin D deficiency        Social History:  Social History     Socioeconomic History   • Marital status: Single   Tobacco Use   • Smoking status: Never   • Smokeless tobacco: Never   Vaping Use   • Vaping Use: Never used   Substance and Sexual Activity   • Alcohol use: Never   • Drug use: Never   • Sexual activity: Defer       Family History:  Family History   Problem Relation Age of Onset   • Breast cancer Mother    • Hypertension Mother    • Diabetes Paternal Grandmother    • Anxiety disorder Daughter    • Depression Daughter    • Thalassemia Daughter    • Hashimoto's thyroiditis Daughter    • Neuropathy Daughter    • Goiter Daughter    • Eczema Daughter        Past Surgical History:  Past Surgical History:   Procedure Laterality Date   • CHOLECYSTECTOMY  1986   • TONSILLECTOMY     • WISDOM TOOTH EXTRACTION         Problem List:  Patient Active Problem List   Diagnosis   • FH: breast cancer   • H/O colonoscopy with polypectomy   • Fecal smearing   • Dyslipidemia   • Vitamin D deficiency   • Type 2 diabetes mellitus without complication, without long-term current use of insulin (HCC)   • Primary hypertension   • Other fatigue   • Herpes simplex vulvovaginitis       Allergy:   No Known Allergies     Current Medications:   Current Outpatient Medications   Medication Sig Dispense Refill   • atorvastatin (LIPITOR) 20 MG tablet Take 1 tablet by mouth Every Night. 90 tablet 3   • cyanocobalamin 1000 MCG/ML injection Inject 1 mL under the skin into the appropriate area as directed Every 30 (Thirty) Days. 10 mL 1   • Dulaglutide (Trulicity) 4.5 MG/0.5ML solution pen-injector Inject 0.5 mL under the skin into the appropriate area as  "directed 1 (One) Time Per Week. 6 mL 1   • DULoxetine (CYMBALTA) 60 MG capsule Take 2 capsules by mouth Daily.     • Emgality 120 MG/ML auto-injector pen INJECT ONE MILLILITER UNDER THE SKIN INTO THE APPROPRIATE AREA EVERY 30 DAYS AS DIRECTED 3 mL 0   • hydroCHLOROthiazide (HYDRODIURIL) 25 MG tablet Take 1 tablet by mouth Daily. 90 tablet 1   • lamoTRIgine (LaMICtal) 200 MG tablet Take 1 tablet by mouth Daily.     • lisinopril (PRINIVIL,ZESTRIL) 40 MG tablet Take 1 tablet by mouth Daily. 90 tablet 1   • metFORMIN ER (GLUCOPHAGE-XR) 750 MG 24 hr tablet Take 1 tablet by mouth Daily With Breakfast.     • Needle, Disp, (BD Disp Needles) 21G X 1-1/2\" misc 1 each Every 30 (Thirty) Days. 10 each 2   • Syringe 20G X 1\" 3 ML misc 1 each Daily. 10 each 5   • valACYclovir (Valtrex) 500 MG tablet Take 1 tablet by mouth 2 (Two) Times a Day. For 3 days per episode 12 tablet 3   • vitamin D (ERGOCALCIFEROL) 1.25 MG (54601 UT) capsule capsule Take 1 capsule by mouth 1 (One) Time Per Week.     • buPROPion XL (Wellbutrin XL) 150 MG 24 hr tablet Take 1 tablet PO QAM 30 tablet 1     Current Facility-Administered Medications   Medication Dose Route Frequency Provider Last Rate Last Admin   • cyanocobalamin injection 1,000 mcg  1,000 mcg Intramuscular Q30 Days Peyton Arechiga MD   1,000 mcg at 03/02/22 1504       Review of Symptoms:    Review of Systems   Constitutional: Positive for activity change, appetite change and fatigue.   Psychiatric/Behavioral: Positive for decreased concentration, sleep disturbance, depressed mood and stress. The patient is nervous/anxious.          Physical Exam:   Due to the remote nature of this encounter (virtual encounter), vitals were unable to be obtained.  Height stated at 61 inches.  Weight stated at 170 pounds.      Physical Exam  Neurological:      Mental Status: She is alert.   Psychiatric:         Attention and Perception: Attention and perception normal. She does not perceive auditory or " visual hallucinations.         Mood and Affect: Mood is anxious and depressed.         Speech: Speech normal.         Behavior: Behavior normal. Behavior is cooperative.         Thought Content: Thought content normal. Thought content is not paranoid or delusional. Thought content does not include homicidal or suicidal ideation. Thought content does not include homicidal or suicidal plan.         Cognition and Memory: Cognition and memory normal.         Judgment: Judgment normal.           Mental Status Exam:   Hygiene:   good  Cooperation:  Cooperative  Eye Contact:  Good  Psychomotor Behavior:  Appropriate  Affect:  Full range  Mood: depressed and anxious  Speech:  Normal  Thought Process:  Goal directed  Thought Content:  Mood congruent  Suicidal:  None  Homicidal:  None  Hallucinations:  None  Delusion:  None  Memory:  Intact  Orientation:  Person, Place, Time and Situation  Reliability:  good  Insight:  Fair  Judgement:  Fair  Impulse Control:  Good      PHQ-9 Depression Screening  Little interest or pleasure in doing things? (P) 3-->nearly every day   Feeling down, depressed, or hopeless? (P) 3-->nearly every day   Trouble falling or staying asleep, or sleeping too much? (P) 3-->nearly every day   Feeling tired or having little energy? (P) 3-->nearly every day   Poor appetite or overeating? (P) 1-->several days   Feeling bad about yourself - or that you are a failure or have let yourself or your family down? (P) 3-->nearly every day   Trouble concentrating on things, such as reading the newspaper or watching television? (P) 3-->nearly every day   Moving or speaking so slowly that other people could have noticed? Or the opposite - being so fidgety or restless that you have been moving around a lot more than usual? (P) 0-->not at all   Thoughts that you would be better off dead, or of hurting yourself in some way? (P) 0-->not at all   PHQ-9 Total Score (P) 19   If you checked off any problems, how difficult  have these problems made it for you to do your work, take care of things at home, or get along with other people? (P) very difficult       Feeling nervous, anxious or on edge: (P) Nearly every day  Not being able to stop or control worrying: (P) Nearly every day  Worrying too much about different things: (P) Nearly every day  Trouble Relaxing: (P) More than half the days  Being so restless that it is hard to sit still: (P) Several days  Feeling afraid as if something awful might happen: (P) Not at all  Becoming easily annoyed or irritable: (P) More than half the days  PHYLICIA 7 Total Score: (P) 14  If you checked any problems, how difficult have these problems made it for you to do your work, take care of things at home, or get along with other people: (P) Very difficult      PROMIS scale screening tool that patient filled out virtually reviewed by this APRN at today's encounter.    *Attempted to obtain pt's SOPATec report. However, the modernized SOPATec application is not currently operative and a request number and report is not available. This APRN did review pt's PDMP*    Previous Provider notes and available records reviewed by this APRN at today's encounter.       Lab Results:   No visits with results within 1 Month(s) from this visit.   Latest known visit with results is:   Office Visit on 11/18/2022   Component Date Value Ref Range Status   • Hemoglobin A1C 11/18/2022 6.8  % Final   • Lot Number 11/18/2022 [id number]   Final   • Expiration Date 11/18/2022 9/7/2024   Final         Assessment & Plan   Problems Addressed this Visit    None  Visit Diagnoses     Major depressive disorder, recurrent episode, moderate (HCC)  (Chronic)   -  Primary    Relevant Medications    buPROPion XL (Wellbutrin XL) 150 MG 24 hr tablet    Generalized anxiety disorder  (Chronic)       Relevant Medications    buPROPion XL (Wellbutrin XL) 150 MG 24 hr tablet      Diagnoses       Codes Comments    Major depressive disorder, recurrent  episode, moderate (HCC)    -  Primary ICD-10-CM: F33.1  ICD-9-CM: 296.32     Generalized anxiety disorder     ICD-10-CM: F41.1  ICD-9-CM: 300.02           Visit Diagnoses:    ICD-10-CM ICD-9-CM   1. Major depressive disorder, recurrent episode, moderate (HCC)  F33.1 296.32   2. Generalized anxiety disorder  F41.1 300.02          GOALS:  Short Term Goals: Patient will be compliant with medication, and patient will have no significant medication related side effects.  Patient will be engaged in psychotherapy as indicated.  Patient will report subjective improvement of symptoms.  Long term goals: To stabilize mood and treat/improve subjective symptoms, the patient will stay out of the hospital, the patient will be at an optimal level of functioning, and the patient will take all medications as prescribed.  The patient verbalized understanding and agreement with goals that were mutually set.      TREATMENT PLAN:   Continue supportive psychotherapy efforts and medications as indicated.   -Pt is currently prescribed Adderall XR from previous PMHNP for fatigue, concentration, and mood. Discussed with the patient that the Biden Administration announced on January 30, 2023 that the national and public health emergencies (PHE) will end on May 11, 2023.  Discussed with the patient that during the PHE a portion of the Trevin [place] Act was waived, allowing controlled substances to be provided via telemedicine without in person encounters. Discussed with the patient that with the PHE ending the Trevin [place] Act will go back into full effect, meaning that prescriptions for controlled substances can not be given via telemedicine without in person encounters and meeting all requirements of the Trevin Alexander Act.  Discussed with the patient that the [facility] is strictly a telemedicine clinic and cannot offer the patient an in-person evaluation to be compliant with the Trevin Alexander Act as it goes  back into effect. If pt would like to pursue treatment with any controlled substance, she will need to establish with a local provider in her area for an in-person evaluation. However, at this time pt is agreeable to discontinue the Adderall XR and continue with this APRN's recommendations.  -Start Wellbutrin  mg PO QAM for depression and anxiety  -Continue Cymbalta 120 mg PO Daily for depression and anxiety using supply from previous provider. This APRN will refill if needed  -Continue Lamictal 200 mg PO Daily for mood stabilization     *If pt does well on Wellbutrin XL, will look to taper pt off Cymbalta.*    This APRN has discussed the benefits and risks of taking/continuing Lamictal (Lamotrigine).  The side effects of Lamictal can include a benign rash, blurred or double vision, dizziness, ataxia, sedation, headache, tremor, insomnia, poor coordination, fatigue,  nausea, vomiting, dyspepsia, rhinitis, infection, pharyngitis, asthenia, a rare but serious rash, rare multi-organ failure associated with Reese-Venkatesh Syndrome, toxic epidermal necrolysis, drug hypersensitivity syndrome, rare blood dyscrasias, rare aseptic meningitis, rare sudden unexplained deaths in people with epilepsy, withdrawal seizures upon abrupt withdrawal, and rare activation of suicidal ideation and behavior (suicidality).  This APRN has discussed that a very slow dose titration when starting, or changing doses, of Lamictal may reduce the incidence of skin rash and other side effects.  The dosage should not be titrated upwards or increased faster than recommended due to the possibility of the discussed side effects and risk of development of a skin rash (which can become life threatening).    This APRN has also discussed that if the patient stops taking the Lamictal for 3-5 days or longer, it will be necessary to restart the drug with an initial dose titration, as rashes have been reported on reexposure.  If the patient and Provider  decide to stop the Lamictal, the patient will follow the directions of this APRN/this office as a guided taper over about two weeks is appropriate due to the risk of relapse in bipolar disorder with those with a mood or bipolar disorder, the risk of seizures in those with epilepsy, and discontinuation symptoms upon rapid discontinuation of Lamictal.    The patient verbalizes understanding of benefits and risks as discussed, the patient/guardian feels the benefits outweigh the risks and is agreeable to continue/take Lamictal as discussed.  The patient is advised should any side effects or rash develops they are to stop the Lamictal immediately and contact this APRN/this office or go to the emergency department immediately.  The patient verbalizes understanding and agreement with treatment plan in their own words.    Medication and treatment options, both pharmacological and non-pharmacological treatment options, discussed during today's visit, including any off label use of medication. Patient acknowledged and verbally consented with current treatment plan and was educated on the importance of compliance with treatment and follow-up appointments.       MEDICATION ISSUES:    Discussed treatment plan and medication options of prescribed medication as well as the risks, benefits, any black box warnings, and side effects including potential falls, possible impaired driving, and metabolic adversities among others, including any off label use of medication. Patient is agreeable to call the office with any worsening of symptoms or onset of side effects, or if any concerns or questions arise.  The contact information for the office is made available to the patient. They may call the [facility] at [phone]. Patient is agreeable to call 911 or go to the nearest ER should they begin having any SI/HI, or if any urgent concerns arise.        SUICIDE RISK ASSESSMENT: Unalterable demographics and a  history of mental health intervention indicate this patient is in a high risk category compared to the general population. At present, the patient denies active SI/HI, intentions, or plans at this time and agrees to seek immediate care should such thoughts develop. The patient verbalizes understanding of how to access emergency care if needed and agrees to do so. Consideration of suicide risk and protective factors such as history, current presentation, individual strengths and weaknesses, psychosocial and environmental stressors and variables, psychiatric illness and symptoms, medical conditions and pain, took place in this interview. Based on those considerations, the patient is determined: within individual baseline and presenting no imminent risk for suicide or homicide. Other recommendations: The patient does not meet the criteria for inpatient admission and is not a safety risk to self or others at today's visit. Inpatient treatment offers no significant advantages over outpatient treatment for this patient at today's visit.      SAFETY PLAN:  Patient was given ample time for questions and fully participated in treatment planning.  Patient was encouraged to call the clinic with any questions or concerns.  Patient was informed of access to emergency care. If patient were to develop any significant symptomatology, suicidal ideation, homicidal ideation, any concerns, or feel unsafe at any time they are to call the clinic and if unable to get immediate assistance should immediately call 911 or go to the nearest emergency room.  The patient is advised to remove or secure (lock away) all lethal weapons (including guns) and sharps (including razors, scissors, knives, etc.).  All medications (including any prescribed and any over the counter medications) should be stored in a safe and secured location that is not obtainable by children/adolescents.  Patient was given an opportunity and encouraged to ask questions  about their medication, illness, and treatment. Patient contracted verbally for the following: If you are experiencing an emotional crisis or have thoughts of harming yourself or others, please go to your nearest local emergency room or call 911. Will continue to re-assess medication response and side effects frequently to establish efficacy and ensure safety. Risks, any black box warnings, side effects, off label usage, and benefits of medication and treatment discussed with patient, along with potential adverse side effects of current and/or newly prescribed medication, alternative treatment options, and OTC medications.  Patient verbalized understanding of potential risks, any off label use of medication, any black box warnings, and any side effects in their own words. The patient verbalized understanding and agreed to comply with the safety plan discussed in their own words.  Patient given the number to the office. Number also available to the 24- hour suicide hotline.      MEDS ORDERED DURING VISIT:  New Medications Ordered This Visit   Medications   • buPROPion XL (Wellbutrin XL) 150 MG 24 hr tablet     Sig: Take 1 tablet PO QAM     Dispense:  30 tablet     Refill:  1       Return in about 4 weeks (around 4/4/2023), or if symptoms worsen or fail to improve, for Recheck.     Treatment plan completed: 3/7/23    Progress toward goal: Not at goal    Functional Status: Moderate impairment     Prognosis: Fair with Ongoing Treatment         This document has been electronically signed by YARY Bush  March 7, 2023 17:22 EST    Some of the data in this electronic note has been brought forward from a previous encounter, any necessary changes have been made, it has been reviewed by this APRN, and it is accurate.    Please note that portions of this note were completed with a voice recognition program. Efforts were made to edit dictation, but occasionally words are mistranscribed.

## 2023-03-14 ENCOUNTER — OFFICE VISIT (OUTPATIENT)
Dept: FAMILY MEDICINE CLINIC | Facility: CLINIC | Age: 63
End: 2023-03-14
Payer: COMMERCIAL

## 2023-03-14 VITALS
DIASTOLIC BLOOD PRESSURE: 64 MMHG | OXYGEN SATURATION: 98 % | SYSTOLIC BLOOD PRESSURE: 110 MMHG | WEIGHT: 175 LBS | HEART RATE: 80 BPM | BODY MASS INDEX: 33.04 KG/M2 | HEIGHT: 61 IN

## 2023-03-14 DIAGNOSIS — E11.9 TYPE 2 DIABETES MELLITUS WITHOUT COMPLICATION, WITHOUT LONG-TERM CURRENT USE OF INSULIN: Primary | ICD-10-CM

## 2023-03-14 DIAGNOSIS — E53.8 B12 DEFICIENCY: ICD-10-CM

## 2023-03-14 LAB
EXPIRATION DATE: NORMAL
HBA1C MFR BLD: 6.4 %
Lab: NORMAL

## 2023-03-14 PROCEDURE — 99214 OFFICE O/P EST MOD 30 MIN: CPT | Performed by: FAMILY MEDICINE

## 2023-03-14 PROCEDURE — 83036 HEMOGLOBIN GLYCOSYLATED A1C: CPT | Performed by: FAMILY MEDICINE

## 2023-03-14 RX ORDER — DULAGLUTIDE 4.5 MG/.5ML
4.5 INJECTION, SOLUTION SUBCUTANEOUS WEEKLY
Qty: 6 ML | Refills: 1 | Status: SHIPPED | OUTPATIENT
Start: 2023-03-14

## 2023-03-14 RX ORDER — SYRINGE, DISPOSABLE, 1 ML
1 SYRINGE, EMPTY DISPOSABLE MISCELLANEOUS
Qty: 12 EACH | Refills: 0 | Status: SHIPPED | OUTPATIENT
Start: 2023-03-14

## 2023-03-14 NOTE — PROGRESS NOTES
"Chief Complaint  Diabetes    Subjective        Vivian Galvez presents to [facility]  Diabetes  She presents for her follow-up diabetic visit. She has type 2 diabetes mellitus. Current diabetic treatment includes oral agent (monotherapy) (GLP-1 injection).     She was started on wellbutrin to wean off cymbalta.  She is seen by behavioral health provider.    She uses needles for b12 but she needs smaller size.     Blood sugar is doing good. . Afternoon 80s. Occasional high readings if she eats something. Pharmacy has been giving Trulicity 3mg due to shortages. Metformin 1 pill daily at night.         Objective   Vital Signs:  /64   Pulse 80   Ht 154.9 cm (60.98\")   Wt 79.4 kg (175 lb)   SpO2 98%   BMI 33.08 kg/m²   Estimated body mass index is 33.08 kg/m² as calculated from the following:    Height as of this encounter: 154.9 cm (60.98\").    Weight as of this encounter: 79.4 kg (175 lb).             Physical Exam  Vitals reviewed.   Constitutional:       General: She is not in acute distress.     Appearance: She is obese. She is not ill-appearing.   Cardiovascular:      Rate and Rhythm: Normal rate and regular rhythm.   Pulmonary:      Effort: Pulmonary effort is normal.      Breath sounds: Normal breath sounds.   Neurological:      Mental Status: She is alert.        Result Review :  The following data was reviewed by: Peyton Arechiga MD on 03/14/2023:  CMP    CMP 8/12/22   Glucose 120 (A)   BUN 14   Creatinine 0.70   eGFR 98.5   Sodium 139   Potassium 4.3   Chloride 98   Calcium 10.0   Total Protein 6.8   Albumin 4.40   Globulin 2.4   Total Bilirubin 0.6   Alkaline Phosphatase 57   AST (SGOT) 18   ALT (SGPT) 17   Albumin/Globulin Ratio 1.8   BUN/Creatinine Ratio 20.0   Anion Gap 11.0   (A) Abnormal value       Comments are available for some flowsheets but are not being displayed.           A1C Last 3 Results    HGBA1C Last 3 Results 8/12/22 11/18/22 3/14/23 "   Hemoglobin A1C 6.40 (A) 6.8 6.4   (A) Abnormal value                         Assessment and Plan   Diagnoses and all orders for this visit:    1. Type 2 diabetes mellitus without complication, without long-term current use of insulin (HCC) (Primary)  -     POC Glycosylated Hemoglobin (Hb A1C)  -     Dulaglutide (Trulicity) 4.5 MG/0.5ML solution pen-injector; Inject 0.5 mL under the skin into the appropriate area as directed 1 (One) Time Per Week.  Dispense: 6 mL; Refill: 1  Improved.  Restart Trulicity 4.5 mg which she was on previously and was decreased to 3 mg only due to shortage.  Continue taking metformin 751 pill/day but she did not need a refill at this time.  Recheck in 3 months.  2. B12 deficiency  -     Needles & Syringes (Easy Touch Syringe Barrel 1ml) misc; 1 each Every 30 (Thirty) Days.  Dispense: 12 each; Refill: 0             Follow Up   Return in about 3 months (around 6/14/2023) for Office visit diabetes.  Patient was given instructions and counseling regarding her condition or for health maintenance advice. Please see specific information pulled into the AVS if appropriate.     Electronically signed by Peyton Arechiga MD, 03/14/23, 11:52 AM EDT.

## 2023-03-27 ENCOUNTER — PRIOR AUTHORIZATION (OUTPATIENT)
Dept: FAMILY MEDICINE CLINIC | Facility: CLINIC | Age: 63
End: 2023-03-27
Payer: COMMERCIAL

## 2023-03-27 NOTE — TELEPHONE ENCOUNTER
(Key: [id number])  Med:Emgality 120MG/ML   Status:sent to plan, awaiting determination   Created:03/27/2023

## 2023-03-30 ENCOUNTER — HOSPITAL ENCOUNTER (OUTPATIENT)
Dept: SLEEP MEDICINE | Facility: HOSPITAL | Age: 63
Discharge: HOME | End: 2023-03-30
Admitting: NURSE PRACTITIONER
Payer: COMMERCIAL

## 2023-03-30 VITALS — WEIGHT: 170 LBS | HEIGHT: 61 IN | BODY MASS INDEX: 32.1 KG/M2

## 2023-03-30 DIAGNOSIS — G47.19 EXCESSIVE DAYTIME SLEEPINESS: ICD-10-CM

## 2023-03-30 DIAGNOSIS — R29.818 SUSPECTED SLEEP APNEA: ICD-10-CM

## 2023-03-30 DIAGNOSIS — R06.83 SNORING: ICD-10-CM

## 2023-03-30 PROCEDURE — 95800 SLP STDY UNATTENDED: CPT

## 2023-03-31 NOTE — TELEPHONE ENCOUNTER
Message from Plan  Your PA has been resolved, no additional PA is required. For further inquiries please contact the number on the back of the member prescription card.

## 2023-04-04 ENCOUNTER — TELEMEDICINE (OUTPATIENT)
Dept: PSYCHIATRY | Facility: CLINIC | Age: 63
End: 2023-04-04
Payer: COMMERCIAL

## 2023-04-04 DIAGNOSIS — F41.1 GENERALIZED ANXIETY DISORDER: Chronic | ICD-10-CM

## 2023-04-04 DIAGNOSIS — F33.1 MAJOR DEPRESSIVE DISORDER, RECURRENT EPISODE, MODERATE: Primary | Chronic | ICD-10-CM

## 2023-04-04 PROCEDURE — 99214 OFFICE O/P EST MOD 30 MIN: CPT | Performed by: NURSE PRACTITIONER

## 2023-04-04 RX ORDER — DULOXETIN HYDROCHLORIDE 30 MG/1
CAPSULE, DELAYED RELEASE ORAL
Qty: 7 CAPSULE | Refills: 0 | Status: SHIPPED | OUTPATIENT
Start: 2023-04-04 | End: 2023-04-19

## 2023-04-04 RX ORDER — BUPROPION HYDROCHLORIDE 300 MG/1
300 TABLET ORAL EVERY MORNING
Qty: 30 TABLET | Refills: 1 | Status: SHIPPED | OUTPATIENT
Start: 2023-04-04 | End: 2023-04-19 | Stop reason: SDUPTHER

## 2023-04-04 RX ORDER — LAMOTRIGINE 200 MG/1
200 TABLET ORAL DAILY
Qty: 30 TABLET | Refills: 1 | Status: SHIPPED | OUTPATIENT
Start: 2023-04-04 | End: 2023-04-19 | Stop reason: SDUPTHER

## 2023-04-04 NOTE — PROGRESS NOTES
"This provider is completing this appointment through [facility] (through [facility]), [address], [place], [address] using a secure [employer] Video Visit through "[employer] Patient is being seen remotely via telehealth in [place], and stated they are in a secure environment for this session. The patient's condition being diagnosed/treated is appropriate for telemedicine. The provider identified herself as well as her credentials.   The patient, and/or patients guardian, consent to be seen remotely, and when consent is given they understand that the consent allows for patient identifiable information to be sent to a third party as needed.   They may refuse to be seen remotely at any time. The electronic data is encrypted and password protected, and the patient and/or guardian has been advised of the potential risks to privacy not withstanding such measures.    You have chosen to receive care through a telehealth visit.  Do you consent to use a video/audio connection for your medical care today? Yes    Patient identifiers utilized: Name and date of birth.          Pablo Galvez is a 63 y.o. female who presents today for follow up    Chief Complaint:  Depression and anxiety    Accompanied by:Pt was alone for duration of appointment    History of Present Illness:   Pt states the last few days she has been \"pretty good\". She hasn't noticed any difference until recently. Pt's PHQ-9 went from a 19 to a 13 today and the PHYLICIA-7 was a 14 and is now a 9. Pt admits that she decreased the Cymbalta to 60 mg PO Daily about two weeks. She experienced some \"brain zaps\" and \"buzzing\" in her ears. It took about a week for it to dissipate. Pt would like to further taper off it. Reeducated pt on discussing any psychotropic medications with me before doing so on her own; pt verbalized understanding and was agreeable. Appetite is stable. Pt had a sleep study completed recently and " she is awaiting results. Pt is averaging 5-6 hours each night; she doesn't wake feeling rested. Pt also snores and has daytime fatigue. Pt continues to lack energy and motivation. Pt's last PMHNP was prescribing her Adderall XR for energy, which this APRN is not able to do. If pt is found to have TARUN, hopefully pap therapy would correct this issue. Pt states she has went out to dinner with a friend twice, has been spending time with her grandchildren, and working out in the yard when the weather is nice. Pt is pushing herself to do activities as discussed last appointment. Pt was initially started on Lamictal to help control mood swings, but was never diagnosed with bipolar disorder. If pt does well on other medication, the hope is to slowly taper her off Lamictal. The patient denies any new medical problems or changes in medications since last appointment with this facility. The patient denies any current side effects from their current medication regimen. The patient denies any abnormal muscle movements or tics. The patient rates their depression on average in the past week at a 5/10 on a 0-10 scale, with 10 being the worst. The patient rates their anxiety on average the past week at a 5/10 on a 0-10 scale, with 10 being the worst. The patient would like to adjust their medications at this visit. The patient denies any suicidal or homicidal ideations, plans, or intent at today's encounter and is convincing. The patient denies any auditory hallucinations or visual hallucinations. The patient does not endorse any significant symptoms consistent with mitra or psychosis at today's encounter.     *If the patient has any concerns or needs assistance, they may call the [facility] at [phone]*          Prior Psychiatric Medications:  Lamictal   Cymbalta  Adderall XR - was prescribed for concentration and fatigue; helped with mood and energy level  Prozac - helpful for about five  years  Effexor XR - weight gain and didn't help as much         The following portions of the patient's history were reviewed and updated as appropriate: allergies, current medications, past family history, past medical history, past social history, past surgical history and problem list.          Past Medical History:  Past Medical History:   Diagnosis Date   • ADHD (attention deficit hyperactivity disorder)    • Anxiety    • Colon polyp    • Depression    • Diabetes mellitus    • Dyslipidemia    • Gall stones    • Gallstone pancreatitis    • GERD (gastroesophageal reflux disease)    • Gout    • Hypertension    • Migraines    • Vitamin D deficiency        Social History:  Social History     Socioeconomic History   • Marital status: Single   Tobacco Use   • Smoking status: Never   • Smokeless tobacco: Never   Vaping Use   • Vaping Use: Never used   Substance and Sexual Activity   • Alcohol use: Not Currently   • Drug use: Never   • Sexual activity: Not Currently     Partners: Male     Birth control/protection: Tubal ligation       Family History:  Family History   Problem Relation Age of Onset   • Breast cancer Mother    • Hypertension Mother    • Diabetes Paternal Grandmother    • Anxiety disorder Daughter    • Depression Daughter    • Thalassemia Daughter    • Hashimoto's thyroiditis Daughter    • Neuropathy Daughter    • Goiter Daughter    • Eczema Daughter        Past Surgical History:  Past Surgical History:   Procedure Laterality Date   • CHOLECYSTECTOMY  1986   • COLONOSCOPY     • TONSILLECTOMY     • TUBAL ABDOMINAL LIGATION     • WISDOM TOOTH EXTRACTION         Problem List:  Patient Active Problem List   Diagnosis   • FH: breast cancer   • H/O colonoscopy with polypectomy   • Fecal smearing   • Dyslipidemia   • Vitamin D deficiency   • Type 2 diabetes mellitus without complication, without long-term current use of insulin   • Primary hypertension   • Other fatigue   • Herpes simplex vulvovaginitis  "      Allergy:   No Known Allergies     Current Medications:   Current Outpatient Medications   Medication Sig Dispense Refill   • lamoTRIgine (LaMICtal) 200 MG tablet Take 1 tablet by mouth Daily. 30 tablet 1   • atorvastatin (LIPITOR) 20 MG tablet Take 1 tablet by mouth Every Night. 90 tablet 3   • buPROPion XL (Wellbutrin XL) 300 MG 24 hr tablet Take 1 tablet by mouth Every Morning. 30 tablet 1   • cyanocobalamin 1000 MCG/ML injection Inject 1 mL under the skin into the appropriate area as directed Every 30 (Thirty) Days. 10 mL 1   • Dulaglutide (Trulicity) 4.5 MG/0.5ML solution pen-injector Inject 0.5 mL under the skin into the appropriate area as directed 1 (One) Time Per Week. 6 mL 1   • DULoxetine (Cymbalta) 30 MG capsule Take 1 capsule PO Daily x7 days, then discontinue 7 capsule 0   • Emgality 120 MG/ML auto-injector pen INJECT ONE MILLILITER UNDER THE SKIN INTO THE APPROPRIATE AREA EVERY 30 DAYS AS DIRECTED 3 mL 0   • hydroCHLOROthiazide (HYDRODIURIL) 25 MG tablet Take 1 tablet by mouth Daily. 90 tablet 1   • lisinopril (PRINIVIL,ZESTRIL) 40 MG tablet Take 1 tablet by mouth Daily. 90 tablet 1   • metFORMIN ER (GLUCOPHAGE-XR) 750 MG 24 hr tablet Take 1 tablet by mouth Daily With Breakfast.     • Needles & Syringes (Easy Touch Syringe Barrel 1ml) misc 1 each Every 30 (Thirty) Days. 12 each 0   • Syringe 20G X 1\" 3 ML misc 1 each Daily. 10 each 5   • valACYclovir (Valtrex) 500 MG tablet Take 1 tablet by mouth 2 (Two) Times a Day. For 3 days per episode 12 tablet 3   • vitamin D (ERGOCALCIFEROL) 1.25 MG (72355 UT) capsule capsule Take 1 capsule by mouth 1 (One) Time Per Week.       Current Facility-Administered Medications   Medication Dose Route Frequency Provider Last Rate Last Admin   • cyanocobalamin injection 1,000 mcg  1,000 mcg Intramuscular Q30 Days Peyton Arechiga MD   1,000 mcg at 03/02/22 1504       Review of Symptoms:    Review of Systems   Constitutional: Positive for activity change and " fatigue.   Psychiatric/Behavioral: Positive for sleep disturbance, depressed mood and stress. The patient is nervous/anxious.          Physical Exam:   Due to the remote nature of this encounter (virtual encounter), vitals were unable to be obtained.  Height stated at 61 inches.  Weight stated at 170 pounds.      Physical Exam  Neurological:      Mental Status: She is alert.   Psychiatric:         Attention and Perception: Attention and perception normal. She does not perceive auditory or visual hallucinations.         Mood and Affect: Affect normal. Mood is depressed.         Speech: Speech normal.         Behavior: Behavior normal. Behavior is cooperative.         Thought Content: Thought content normal. Thought content is not paranoid or delusional. Thought content does not include homicidal or suicidal ideation. Thought content does not include homicidal or suicidal plan.         Cognition and Memory: Cognition and memory normal.         Judgment: Judgment normal.           Mental Status Exam:   Hygiene:   good  Cooperation:  Cooperative  Eye Contact:  Good  Psychomotor Behavior:  Appropriate  Affect:  Full range  Mood: sad  Speech:  Normal  Thought Process:  Goal directed  Thought Content:  Mood congruent  Suicidal:  None  Homicidal:  None  Hallucinations:  None  Delusion:  None  Memory:  Intact  Orientation:  Person, Place, Time and Situation  Reliability:  good  Insight:  Fair  Judgement:  Fair  Impulse Control:  Good       PHQ-9 Depression Screening  Little interest or pleasure in doing things? (P) 2-->more than half the days   Feeling down, depressed, or hopeless? (P) 2-->more than half the days   Trouble falling or staying asleep, or sleeping too much? (P) 1-->several days   Feeling tired or having little energy? (P) 3-->nearly every day   Poor appetite or overeating? (P) 1-->several days   Feeling bad about yourself - or that you are a failure or have let yourself or your family down? (P) 1-->several days    Trouble concentrating on things, such as reading the newspaper or watching television? (P) 1-->several days   Moving or speaking so slowly that other people could have noticed? Or the opposite - being so fidgety or restless that you have been moving around a lot more than usual? (P) 1-->several days   Thoughts that you would be better off dead, or of hurting yourself in some way? (P) 1-->several days   PHQ-9 Total Score (P) 13   If you checked off any problems, how difficult have these problems made it for you to do your work, take care of things at home, or get along with other people? (P) somewhat difficult     PHQ-9 Total Score: (P) 13      PHYLICIA-7  Feeling nervous, anxious or on edge: (P) Several days  Not being able to stop or control worrying: (P) Several days  Worrying too much about different things: (P) Several days  Trouble Relaxing: (P) More than half the days  Being so restless that it is hard to sit still: (P) Nearly every day  Feeling afraid as if something awful might happen: (P) Not at all  Becoming easily annoyed or irritable: (P) Several days  PHYLICIA 7 Total Score: (P) 9  If you checked any problems, how difficult have these problems made it for you to do your work, take care of things at home, or get along with other people: (P) Somewhat difficult      PROMIS scale screening tool that patient filled out virtually prior to encounter beginning reviewed by this APRN at today's encounter.    Previous Provider notes and available records reviewed by this APRN at today's encounter.       Lab Results:   Office Visit on 03/14/2023   Component Date Value Ref Range Status   • Hemoglobin A1C 03/14/2023 6.4  % Final   • Lot Number 03/14/2023 [id number]   Final   • Expiration Date 03/14/2023 11/29/2024   Final         Assessment & Plan   Problems Addressed this Visit    None  Visit Diagnoses     Major depressive disorder, recurrent episode, moderate  (Chronic)   -  Primary    Relevant Medications    DULoxetine  (Cymbalta) 30 MG capsule    buPROPion XL (Wellbutrin XL) 300 MG 24 hr tablet    lamoTRIgine (LaMICtal) 200 MG tablet    Generalized anxiety disorder  (Chronic)       Relevant Medications    DULoxetine (Cymbalta) 30 MG capsule    buPROPion XL (Wellbutrin XL) 300 MG 24 hr tablet    lamoTRIgine (LaMICtal) 200 MG tablet      Diagnoses       Codes Comments    Major depressive disorder, recurrent episode, moderate    -  Primary ICD-10-CM: F33.1  ICD-9-CM: 296.32     Generalized anxiety disorder     ICD-10-CM: F41.1  ICD-9-CM: 300.02           Visit Diagnoses:    ICD-10-CM ICD-9-CM   1. Major depressive disorder, recurrent episode, moderate  F33.1 296.32   2. Generalized anxiety disorder  F41.1 300.02          GOALS:  Short Term Goals: Patient will be compliant with medication, and patient will have no significant medication related side effects.  Patient will be engaged in psychotherapy as indicated.  Patient will report subjective improvement of symptoms.  Long term goals: To stabilize mood and treat/improve subjective symptoms, the patient will stay out of the hospital, the patient will be at an optimal level of functioning, and the patient will take all medications as prescribed.  The patient verbalized understanding and agreement with goals that were mutually set.      TREATMENT PLAN:   Continue supportive psychotherapy efforts and medications as indicated.   -Pt is currently prescribed Adderall XR from previous PMHNP for fatigue, concentration, and mood.  Discussed with the patient that the Biden Administration announced on January 30, 2023 that the national and public health emergencies (PHE) will end on May 11, 2023.  Discussed with the patient that during the PHE a portion of the [place] Act was waived, allowing controlled substances to be provided via telemedicine without in person encounters. Discussed with the patient that with the PHE ending the [place] Act will go back into full effect, meaning that  prescriptions for controlled substances can not be given via telemedicine without in person encounters and meeting all requirements of the [place] Act.  Discussed with the patient that the [facility] is strictly a telemedicine clinic and cannot offer the patient an in-person evaluation to be compliant with the [place] Act as it goes back into effect. If pt would like to pursue treatment with any controlled substance, she will need to establish with a local provider in her area for an in-person evaluation. Pt verbalized understanding.   -Increase Wellbutrin XL to 300 mg PO QAM for depression and anxiety  -Decrease Cymbalta to 30 mg PO Daily x7 days, the discontinue  -Continue Lamictal 200 mg PO Daily for mood stabilization     *Pt was initially started on Lamictal to help control mood swings, but was never diagnosed with bipolar disorder. If pt does well on other medication, the hope is to slowly taper her off Lamictal.*     This APRN has discussed the benefits and risks of taking/continuing Lamictal (Lamotrigine).  The side effects of Lamictal can include a benign rash, blurred or double vision, dizziness, ataxia, sedation, headache, tremor, insomnia, poor coordination, fatigue,  nausea, vomiting, dyspepsia, rhinitis, infection, pharyngitis, asthenia, a rare but serious rash, rare multi-organ failure associated with Reese-Venkatesh Syndrome, toxic epidermal necrolysis, drug hypersensitivity syndrome, rare blood dyscrasias, rare aseptic meningitis, rare sudden unexplained deaths in people with epilepsy, withdrawal seizures upon abrupt withdrawal, and rare activation of suicidal ideation and behavior (suicidality).  This APRN has discussed that a very slow dose titration when starting, or changing doses, of Lamictal may reduce the incidence of skin rash and other side effects.  The dosage should not be titrated upwards or increased faster than recommended due to the  possibility of the discussed side effects and risk of development of a skin rash (which can become life threatening).    This APRN has also discussed that if the patient stops taking the Lamictal for 3-5 days or longer, it will be necessary to restart the drug with an initial dose titration, as rashes have been reported on reexposure.  If the patient and Provider decide to stop the Lamictal, the patient will follow the directions of this APRN/this office as a guided taper over about two weeks is appropriate due to the risk of relapse in bipolar disorder with those with a mood or bipolar disorder, the risk of seizures in those with epilepsy, and discontinuation symptoms upon rapid discontinuation of Lamictal.    The patient verbalizes understanding of benefits and risks as discussed, the patient/guardian feels the benefits outweigh the risks and is agreeable to continue/take Lamictal as discussed.  The patient is advised should any side effects or rash develops they are to stop the Lamictal immediately and contact this APRN/this office or go to the emergency department immediately.  The patient verbalizes understanding and agreement with treatment plan in their own words.    Medication and treatment options, both pharmacological and non-pharmacological treatment options, discussed during today's visit, including any off label use of medication. Patient acknowledged and verbally consented with current treatment plan and was educated on the importance of compliance with treatment and follow-up appointments.       MEDICATION ISSUES:    Discussed treatment plan and medication options of prescribed medication as well as the risks, benefits, any black box warnings, and side effects including potential falls, possible impaired driving, and metabolic adversities among others, including any off label use of medication. Patient is agreeable to call the office with any worsening of symptoms or onset of side effects, or if any  concerns or questions arise.  The contact information for the office is made available to the patient. They may call the [facility] at [phone]. Patient is agreeable to call 911 or go to the nearest ER should they begin having any SI/HI, or if any urgent concerns arise.        SUICIDE RISK ASSESSMENT: Unalterable demographics and a history of mental health intervention indicate this patient is in a high risk category compared to the general population. At present, the patient denies active SI/HI, intentions, or plans at this time and agrees to seek immediate care should such thoughts develop. The patient verbalizes understanding of how to access emergency care if needed and agrees to do so. Consideration of suicide risk and protective factors such as history, current presentation, individual strengths and weaknesses, psychosocial and environmental stressors and variables, psychiatric illness and symptoms, medical conditions and pain, took place in this interview. Based on those considerations, the patient is determined: within individual baseline and presenting no imminent risk for suicide or homicide. Other recommendations: The patient does not meet the criteria for inpatient admission and is not a safety risk to self or others at today's visit. Inpatient treatment offers no significant advantages over outpatient treatment for this patient at today's visit.      SAFETY PLAN:  Patient was given ample time for questions and fully participated in treatment planning.  Patient was encouraged to call the clinic with any questions or concerns.  Patient was informed of access to emergency care. If patient were to develop any significant symptomatology, suicidal ideation, homicidal ideation, any concerns, or feel unsafe at any time they are to call the clinic and if unable to get immediate assistance should immediately call 911 or go to the nearest emergency room.  The patient is advised to  remove or secure (lock away) all lethal weapons (including guns) and sharps (including razors, scissors, knives, etc.).  All medications (including any prescribed and any over the counter medications) should be stored in a safe and secured location that is not obtainable by children/adolescents.  Patient was given an opportunity and encouraged to ask questions about their medication, illness, and treatment. Patient contracted verbally for the following: If you are experiencing an emotional crisis or have thoughts of harming yourself or others, please go to your nearest local emergency room or call 911. Will continue to re-assess medication response and side effects frequently to establish efficacy and ensure safety. Risks, any black box warnings, side effects, off label usage, and benefits of medication and treatment discussed with patient, along with potential adverse side effects of current and/or newly prescribed medication, alternative treatment options, and OTC medications.  Patient verbalized understanding of potential risks, any off label use of medication, any black box warnings, and any side effects in their own words. The patient verbalized understanding and agreed to comply with the safety plan discussed in their own words.  Patient given the number to the office. Number also available to the 24- hour suicide hotline.      MEDS ORDERED DURING VISIT:  New Medications Ordered This Visit   Medications   • DULoxetine (Cymbalta) 30 MG capsule     Sig: Take 1 capsule PO Daily x7 days, then discontinue     Dispense:  7 capsule     Refill:  0   • buPROPion XL (Wellbutrin XL) 300 MG 24 hr tablet     Sig: Take 1 tablet by mouth Every Morning.     Dispense:  30 tablet     Refill:  1   • lamoTRIgine (LaMICtal) 200 MG tablet     Sig: Take 1 tablet by mouth Daily.     Dispense:  30 tablet     Refill:  1       Return in about 2 weeks (around 4/18/2023), or if symptoms worsen or fail to improve, for Recheck.      Treatment plan completed: 3/7/23    Progress toward goal: Not at goal    Functional Status: Moderate impairment     Prognosis: Fair with Ongoing Treatment         This document has been electronically signed by YARY Bush  April 4, 2023 17:31 EDT    Some of the data in this electronic note has been brought forward from a previous encounter, any necessary changes have been made, it has been reviewed by this APRN, and it is accurate.    Please note that portions of this note were completed with a voice recognition program. Efforts were made to edit dictation, but occasionally words are mistranscribed.

## 2023-04-11 ENCOUNTER — PATIENT MESSAGE (OUTPATIENT)
Dept: SLEEP MEDICINE | Facility: HOSPITAL | Age: 63
End: 2023-04-11
Payer: COMMERCIAL

## 2023-04-11 PROCEDURE — 95800 SLP STDY UNATTENDED: CPT | Performed by: INTERNAL MEDICINE

## 2023-04-13 DIAGNOSIS — F41.1 GENERALIZED ANXIETY DISORDER: Chronic | ICD-10-CM

## 2023-04-13 DIAGNOSIS — F33.1 MAJOR DEPRESSIVE DISORDER, RECURRENT EPISODE, MODERATE: Chronic | ICD-10-CM

## 2023-04-13 RX ORDER — DULOXETIN HYDROCHLORIDE 30 MG/1
CAPSULE, DELAYED RELEASE ORAL
Qty: 7 CAPSULE | Refills: 0 | OUTPATIENT
Start: 2023-04-13

## 2023-04-17 ENCOUNTER — PRIOR AUTHORIZATION (OUTPATIENT)
Dept: FAMILY MEDICINE CLINIC | Facility: CLINIC | Age: 63
End: 2023-04-17
Payer: COMMERCIAL

## 2023-04-17 DIAGNOSIS — G43.009 MIGRAINE WITHOUT AURA AND WITHOUT STATUS MIGRAINOSUS, NOT INTRACTABLE: ICD-10-CM

## 2023-04-17 RX ORDER — GALCANEZUMAB 120 MG/ML
120 INJECTION, SOLUTION SUBCUTANEOUS ONCE
Qty: 3 ML | Refills: 1 | Status: SHIPPED | OUTPATIENT
Start: 2023-04-17 | End: 2023-04-17

## 2023-04-17 RX ORDER — GALCANEZUMAB 120 MG/ML
INJECTION, SOLUTION SUBCUTANEOUS
Qty: 3 ML | Refills: 0 | OUTPATIENT
Start: 2023-04-17

## 2023-04-17 NOTE — TELEPHONE ENCOUNTER
(Key: [id number])  Med:Emgality 120MG/ML auto-injectors   Status:sent to plan, awaiting determination   Created:04/17/2023

## 2023-04-17 NOTE — TELEPHONE ENCOUNTER
Rx Refill Note  Requested Prescriptions     Pending Prescriptions Disp Refills   • galcanezumab-gnlm (Emgality) 120 MG/ML auto-injector pen 3 mL 0      Last office visit with prescribing clinician: 3/14/2023   Last telemedicine visit with prescribing clinician: 6/14/2023   Next office visit with prescribing clinician: 6/14/2023                         Would you like a call back once the refill request has been completed: [] Yes [] No    If the office needs to give you a call back, can they leave a voicemail: [] Yes [] No    Geno Swain MA  04/17/23, 10:23 EDT

## 2023-04-19 ENCOUNTER — TELEMEDICINE (OUTPATIENT)
Dept: PSYCHIATRY | Facility: CLINIC | Age: 63
End: 2023-04-19
Payer: COMMERCIAL

## 2023-04-19 DIAGNOSIS — F33.1 MAJOR DEPRESSIVE DISORDER, RECURRENT EPISODE, MODERATE: Primary | Chronic | ICD-10-CM

## 2023-04-19 DIAGNOSIS — G47.9 SLEEP DIFFICULTIES: ICD-10-CM

## 2023-04-19 DIAGNOSIS — F41.1 GENERALIZED ANXIETY DISORDER: Chronic | ICD-10-CM

## 2023-04-19 PROCEDURE — 99214 OFFICE O/P EST MOD 30 MIN: CPT | Performed by: NURSE PRACTITIONER

## 2023-04-19 RX ORDER — FLUOXETINE HYDROCHLORIDE 20 MG/1
20 CAPSULE ORAL DAILY
Qty: 30 CAPSULE | Refills: 1 | Status: SHIPPED | OUTPATIENT
Start: 2023-04-19

## 2023-04-19 RX ORDER — LAMOTRIGINE 200 MG/1
200 TABLET ORAL DAILY
Qty: 30 TABLET | Refills: 1 | Status: SHIPPED | OUTPATIENT
Start: 2023-04-19

## 2023-04-19 RX ORDER — TRAZODONE HYDROCHLORIDE 50 MG/1
TABLET ORAL
Qty: 30 TABLET | Refills: 1 | Status: SHIPPED | OUTPATIENT
Start: 2023-04-19

## 2023-04-19 RX ORDER — BUPROPION HYDROCHLORIDE 300 MG/1
300 TABLET ORAL EVERY MORNING
Qty: 30 TABLET | Refills: 1 | Status: SHIPPED | OUTPATIENT
Start: 2023-04-19

## 2023-04-19 NOTE — PROGRESS NOTES
This provider is completing this appointment through [facility] (through [facility]), [address], [place] [address] using a secure [employer] Video Visit through [employer]. Patient is being seen remotely via telehealth in [place], and stated they are in a secure environment for this session. The patient's condition being diagnosed/treated is appropriate for telemedicine. The provider identified herself as well as her credentials.   The patient, and/or patients guardian, consent to be seen remotely, and when consent is given they understand that the consent allows for patient identifiable information to be sent to a third party as needed.   They may refuse to be seen remotely at any time. The electronic data is encrypted and password protected, and the patient and/or guardian has been advised of the potential risks to privacy not withstanding such measures.    You have chosen to receive care through a telehealth visit.  Do you consent to use a video/audio connection for your medical care today? Yes    Patient identifiers utilized: Name and date of birth.        Pablo Galvez is a 63 y.o. female who presents today for follow up    Chief Complaint:  Depression, anxiety, sleep difficulties    Accompanied by:Pt was alone for duration of appointment    History of Present Illness:   Pt states she has been off the Cymbalta for about four days and she is having mild headaches, dizziness, and ringing in her ears. Pt expected this to happen as it occurred before when she missed several doses of Cymbalta. Pt reports she hasn't noticed any difference in her mood since the discontinuation of Cymbalta and the increase in Wellbutrin XL. However, her PHQ-9 scored went from a 19 to a 13 to now a 10. Pt's PHYLICIA-7 score went from a 14 to a 9 to now a 3. Sleep has been stable, but has been taking Benadryl 25-50 mg PO QHS. She feels groggy the next day when she wakes. Pt is agreeable  to a trial of Trazodone. Appetite is normal. On the days that are nice, pt spends time out in her yard. Pt has been reading, taking walks, and visiting her grandchildren. 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 However, she would be concerned with how much she has to use her hands and stooping/bending at the job. Pt is looking forward to her vacation with her family at the end of May to [place] for a week. Pt is hoping to feel better by that time. Pt stopped taking the Adderall XR from her previous provider and notices a mild difference in energy level along with concentration and focus. Per pt, she had her sleep study and was not found to have TARUN, but does have issues when sleeping on her back. The patient denies any new medical problems or changes in medications since last appointment with this facility. The patient reports compliance with current medication regimen. The patient denies any current side effects from their current medication regimen. The patient denies any abnormal muscle movements or tics. The patient rates their depression on average in the past week at a 5/10 on a 0-10 scale, with 10 being the worst. The patient rates their anxiety on average the past week at a 2/10 on a 0-10 scale, with 10 being the worst. The patient would like to adjust their medications at this visit. The patient denies any suicidal or homicidal ideations, plans, or intent at today's encounter and is convincing. The patient denies any auditory hallucinations or visual hallucinations. The patient does not endorse any significant symptoms consistent with mitra or psychosis at today's encounter.     *If the patient has any concerns or needs assistance, they may call the [facility] at [phone]*          Prior Psychiatric Medications:  Lamictal   Cymbalta  Adderall XR - was prescribed for concentration and fatigue; helped with mood and energy level  Prozac -  helpful for about five years  Effexor XR - weight gain and didn't help as much         The following portions of the patient's history were reviewed and updated as appropriate: allergies, current medications, past family history, past medical history, past social history, past surgical history and problem list.          Past Medical History:  Past Medical History:   Diagnosis Date   • ADHD (attention deficit hyperactivity disorder)    • Anxiety    • Colon polyp    • Depression    • Diabetes mellitus    • Dyslipidemia    • Gall stones    • Gallstone pancreatitis    • GERD (gastroesophageal reflux disease)    • Gout    • Hypertension    • Migraines    • Vitamin D deficiency        Social History:  Social History     Socioeconomic History   • Marital status: Single   Tobacco Use   • Smoking status: Never   • Smokeless tobacco: Never   Vaping Use   • Vaping Use: Never used   Substance and Sexual Activity   • Alcohol use: Not Currently   • Drug use: Never   • Sexual activity: Not Currently     Partners: Male     Birth control/protection: Tubal ligation       Family History:  Family History   Problem Relation Age of Onset   • Breast cancer Mother    • Hypertension Mother    • Diabetes Paternal Grandmother    • Anxiety disorder Daughter    • Depression Daughter    • Thalassemia Daughter    • Hashimoto's thyroiditis Daughter    • Neuropathy Daughter    • Goiter Daughter    • Eczema Daughter        Past Surgical History:  Past Surgical History:   Procedure Laterality Date   • CHOLECYSTECTOMY  1986   • COLONOSCOPY     • TONSILLECTOMY     • TUBAL ABDOMINAL LIGATION     • WISDOM TOOTH EXTRACTION         Problem List:  Patient Active Problem List   Diagnosis   • FH: breast cancer   • H/O colonoscopy with polypectomy   • Fecal smearing   • Dyslipidemia   • Vitamin D deficiency   • Type 2 diabetes mellitus without complication, without long-term current use of insulin   • Primary hypertension   • Other fatigue   • Herpes simplex  "vulvovaginitis       Allergy:   No Known Allergies     Current Medications:   Current Outpatient Medications   Medication Sig Dispense Refill   • buPROPion XL (Wellbutrin XL) 300 MG 24 hr tablet Take 1 tablet by mouth Every Morning. 30 tablet 1   • lamoTRIgine (LaMICtal) 200 MG tablet Take 1 tablet by mouth Daily. 30 tablet 1   • atorvastatin (LIPITOR) 20 MG tablet Take 1 tablet by mouth Every Night. 90 tablet 3   • cyanocobalamin 1000 MCG/ML injection Inject 1 mL under the skin into the appropriate area as directed Every 30 (Thirty) Days. 10 mL 1   • Dulaglutide (Trulicity) 4.5 MG/0.5ML solution pen-injector Inject 0.5 mL under the skin into the appropriate area as directed 1 (One) Time Per Week. 6 mL 1   • FLUoxetine (PROzac) 20 MG capsule Take 1 capsule by mouth Daily. 30 capsule 1   • hydroCHLOROthiazide (HYDRODIURIL) 25 MG tablet Take 1 tablet by mouth Daily. 90 tablet 1   • lisinopril (PRINIVIL,ZESTRIL) 40 MG tablet Take 1 tablet by mouth Daily. 90 tablet 1   • metFORMIN ER (GLUCOPHAGE-XR) 750 MG 24 hr tablet Take 1 tablet by mouth Daily With Breakfast.     • Needles & Syringes (Easy Touch Syringe Barrel 1ml) misc 1 each Every 30 (Thirty) Days. 12 each 0   • Syringe 20G X 1\" 3 ML misc 1 each Daily. 10 each 5   • traZODone (DESYREL) 50 MG tablet Take 1/2-1 tablet PO QHS PRN for sleep 30 tablet 1   • valACYclovir (Valtrex) 500 MG tablet Take 1 tablet by mouth 2 (Two) Times a Day. For 3 days per episode 12 tablet 3   • vitamin D (ERGOCALCIFEROL) 1.25 MG (86620 UT) capsule capsule Take 1 capsule by mouth 1 (One) Time Per Week.       Current Facility-Administered Medications   Medication Dose Route Frequency Provider Last Rate Last Admin   • cyanocobalamin injection 1,000 mcg  1,000 mcg Intramuscular Q30 Days Peyton Arechiga MD   1,000 mcg at 03/02/22 1504       Review of Symptoms:    Review of Systems   Constitutional: Positive for fatigue.   Psychiatric/Behavioral: Positive for sleep disturbance, depressed " mood and stress. The patient is nervous/anxious.          Physical Exam:   Due to the remote nature of this encounter (virtual encounter), vitals were unable to be obtained.  Height stated at 61 inches.  Weight stated at 170 pounds.      Physical Exam  Neurological:      Mental Status: She is alert.   Psychiatric:         Attention and Perception: Attention and perception normal.         Mood and Affect: Mood and affect normal.         Speech: Speech normal.         Behavior: Behavior normal. Behavior is cooperative.         Thought Content: Thought content normal. Thought content is not paranoid or delusional. Thought content does not include homicidal or suicidal ideation. Thought content does not include homicidal or suicidal plan.         Cognition and Memory: Cognition and memory normal.         Judgment: Judgment normal.      Comments: Normal mood today, but reports ongoing depression           Mental Status Exam:   Hygiene:   good  Cooperation:  Cooperative  Eye Contact:  Good  Psychomotor Behavior:  Appropriate  Affect:  Appropriate  Mood: normal  Speech:  Normal  Thought Process:  Linear  Thought Content:  Normal  Suicidal:  None  Homicidal:  None  Hallucinations:  None  Delusion:  None  Memory:  Intact  Orientation:  Person, Place, Time and Situation  Reliability:  good  Insight:  Good  Judgement:  Good  Impulse Control:  Good       PHQ-9 Depression Screening  Little interest or pleasure in doing things? (P) 2-->more than half the days   Feeling down, depressed, or hopeless? (P) 1-->several days   Trouble falling or staying asleep, or sleeping too much? (P) 1-->several days   Feeling tired or having little energy? (P) 2-->more than half the days   Poor appetite or overeating? (P) 1-->several days   Feeling bad about yourself - or that you are a failure or have let yourself or your family down? (P) 2-->more than half the days   Trouble concentrating on things, such as reading the newspaper or watching  television? (P) 1-->several days   Moving or speaking so slowly that other people could have noticed? Or the opposite - being so fidgety or restless that you have been moving around a lot more than usual? (P) 0-->not at all   Thoughts that you would be better off dead, or of hurting yourself in some way? (P) 0-->not at all   PHQ-9 Total Score (P) 10   If you checked off any problems, how difficult have these problems made it for you to do your work, take care of things at home, or get along with other people? (P) somewhat difficult     PHQ-9 Total Score: (P) 10      PHYLICIA-7  Feeling nervous, anxious or on edge: (P) Several days  Not being able to stop or control worrying: (P) Several days  Worrying too much about different things: (P) Several days  Trouble Relaxing: (P) Not at all  Being so restless that it is hard to sit still: (P) Not at all  Feeling afraid as if something awful might happen: (P) Not at all  Becoming easily annoyed or irritable: (P) Not at all  PHYLICIA 7 Total Score: (P) 3  If you checked any problems, how difficult have these problems made it for you to do your work, take care of things at home, or get along with other people: (P) Somewhat difficult      PROMIS scale screening tool that patient filled out virtually prior to encounter beginning reviewed by this APRN at today's encounter.    Previous Provider notes and available records reviewed by this APRN at today's encounter.       Lab Results:   No visits with results within 1 Month(s) from this visit.   Latest known visit with results is:   Office Visit on 03/14/2023   Component Date Value Ref Range Status   • Hemoglobin A1C 03/14/2023 6.4  % Final   • Lot Number 03/14/2023 [id number]   Final   • Expiration Date 03/14/2023 11/29/2024   Final         Assessment & Plan   Problems Addressed this Visit    None  Visit Diagnoses     Major depressive disorder, recurrent episode, moderate  (Chronic)   -  Primary    Relevant Medications    FLUoxetine  (PROzac) 20 MG capsule    buPROPion XL (Wellbutrin XL) 300 MG 24 hr tablet    traZODone (DESYREL) 50 MG tablet    lamoTRIgine (LaMICtal) 200 MG tablet    Generalized anxiety disorder  (Chronic)       Relevant Medications    FLUoxetine (PROzac) 20 MG capsule    buPROPion XL (Wellbutrin XL) 300 MG 24 hr tablet    traZODone (DESYREL) 50 MG tablet    lamoTRIgine (LaMICtal) 200 MG tablet    Sleep difficulties        Relevant Medications    traZODone (DESYREL) 50 MG tablet      Diagnoses       Codes Comments    Major depressive disorder, recurrent episode, moderate    -  Primary ICD-10-CM: F33.1  ICD-9-CM: 296.32     Generalized anxiety disorder     ICD-10-CM: F41.1  ICD-9-CM: 300.02     Sleep difficulties     ICD-10-CM: G47.9  ICD-9-CM: 780.50           Visit Diagnoses:    ICD-10-CM ICD-9-CM   1. Major depressive disorder, recurrent episode, moderate  F33.1 296.32   2. Generalized anxiety disorder  F41.1 300.02   3. Sleep difficulties  G47.9 780.50          GOALS:  Short Term Goals: Patient will be compliant with medication, and patient will have no significant medication related side effects.  Patient will be engaged in psychotherapy as indicated.  Patient will report subjective improvement of symptoms.  Long term goals: To stabilize mood and treat/improve subjective symptoms, the patient will stay out of the hospital, the patient will be at an optimal level of functioning, and the patient will take all medications as prescribed.  The patient verbalized understanding and agreement with goals that were mutually set.      TREATMENT PLAN:   Continue supportive psychotherapy efforts and medications as indicated.   -Continue Wellbutrin  mg PO QAM for depression and anxiety  -Start Prozac 20 mg PO Daily for depression and anxiety  -Start Trazodone 25-50 mg PO QHS PRN for sleep  -Continue Lamictal 200 mg PO Daily for mood stabilization   -Follow-up in 8 weeks due to APRN availability and pt going on vacation    *Pt was  initially started on Lamictal to help control mood swings, but was never diagnosed with bipolar disorder. If pt does well on other medication, the hope is to slowly taper her off Lamictal.*     This APRN has discussed the benefits and risks of taking/continuing Lamictal (Lamotrigine).  The side effects of Lamictal can include a benign rash, blurred or double vision, dizziness, ataxia, sedation, headache, tremor, insomnia, poor coordination, fatigue,  nausea, vomiting, dyspepsia, rhinitis, infection, pharyngitis, asthenia, a rare but serious rash, rare multi-organ failure associated with Reese-Venkatesh Syndrome, toxic epidermal necrolysis, drug hypersensitivity syndrome, rare blood dyscrasias, rare aseptic meningitis, rare sudden unexplained deaths in people with epilepsy, withdrawal seizures upon abrupt withdrawal, and rare activation of suicidal ideation and behavior (suicidality).  This APRN has discussed that a very slow dose titration when starting, or changing doses, of Lamictal may reduce the incidence of skin rash and other side effects.  The dosage should not be titrated upwards or increased faster than recommended due to the possibility of the discussed side effects and risk of development of a skin rash (which can become life threatening).    This APRN has also discussed that if the patient stops taking the Lamictal for 3-5 days or longer, it will be necessary to restart the drug with an initial dose titration, as rashes have been reported on reexposure.  If the patient and Provider decide to stop the Lamictal, the patient will follow the directions of this APRN/this office as a guided taper over about two weeks is appropriate due to the risk of relapse in bipolar disorder with those with a mood or bipolar disorder, the risk of seizures in those with epilepsy, and discontinuation symptoms upon rapid discontinuation of Lamictal.    The patient verbalizes understanding of benefits and risks as discussed,  the patient/guardian feels the benefits outweigh the risks and is agreeable to continue/take Lamictal as discussed.  The patient is advised should any side effects or rash develops they are to stop the Lamictal immediately and contact this APRN/this office or go to the emergency department immediately.  The patient verbalizes understanding and agreement with treatment plan in their own words.    Medication and treatment options, both pharmacological and non-pharmacological treatment options, discussed during today's visit, including any off label use of medication. Patient acknowledged and verbally consented with current treatment plan and was educated on the importance of compliance with treatment and follow-up appointments.       MEDICATION ISSUES:    Discussed treatment plan and medication options of prescribed medication as well as the risks, benefits, any black box warnings, and side effects including potential falls, possible impaired driving, and metabolic adversities among others, including any off label use of medication. Patient is agreeable to call the office with any worsening of symptoms or onset of side effects, or if any concerns or questions arise.  The contact information for the office is made available to the patient. They may call the [facility] at [phone]. Patient is agreeable to call [phone] or go to the nearest ER should they begin having any SI/HI, or if any urgent concerns arise.        SUICIDE RISK ASSESSMENT: Unalterable demographics and a history of mental health intervention indicate this patient is in a high risk category compared to the general population. At present, the patient denies active SI/HI, intentions, or plans at this time and agrees to seek immediate care should such thoughts develop. The patient verbalizes understanding of how to access emergency care if needed and agrees to do so. Consideration of suicide risk and protective factors such as  history, current presentation, individual strengths and weaknesses, psychosocial and environmental stressors and variables, psychiatric illness and symptoms, medical conditions and pain, took place in this interview. Based on those considerations, the patient is determined: within individual baseline and presenting no imminent risk for suicide or homicide. Other recommendations: The patient does not meet the criteria for inpatient admission and is not a safety risk to self or others at today's visit. Inpatient treatment offers no significant advantages over outpatient treatment for this patient at today's visit.      SAFETY PLAN:  Patient was given ample time for questions and fully participated in treatment planning.  Patient was encouraged to call the clinic with any questions or concerns.  Patient was informed of access to emergency care. If patient were to develop any significant symptomatology, suicidal ideation, homicidal ideation, any concerns, or feel unsafe at any time they are to call the clinic and if unable to get immediate assistance should immediately call 911 or go to the nearest emergency room.  The patient is advised to remove or secure (lock away) all lethal weapons (including guns) and sharps (including razors, scissors, knives, etc.).  All medications (including any prescribed and any over the counter medications) should be stored in a safe and secured location that is not obtainable by children/adolescents.  Patient was given an opportunity and encouraged to ask questions about their medication, illness, and treatment. Patient contracted verbally for the following: If you are experiencing an emotional crisis or have thoughts of harming yourself or others, please go to your nearest local emergency room or call 911. Will continue to re-assess medication response and side effects frequently to establish efficacy and ensure safety. Risks, any black box warnings, side effects, off label usage, and  benefits of medication and treatment discussed with patient, along with potential adverse side effects of current and/or newly prescribed medication, alternative treatment options, and OTC medications.  Patient verbalized understanding of potential risks, any off label use of medication, any black box warnings, and any side effects in their own words. The patient verbalized understanding and agreed to comply with the safety plan discussed in their own words.  Patient given the number to the office. Number also available to the 24- hour suicide hotline.      MEDS ORDERED DURING VISIT:  New Medications Ordered This Visit   Medications   • FLUoxetine (PROzac) 20 MG capsule     Sig: Take 1 capsule by mouth Daily.     Dispense:  30 capsule     Refill:  1   • buPROPion XL (Wellbutrin XL) 300 MG 24 hr tablet     Sig: Take 1 tablet by mouth Every Morning.     Dispense:  30 tablet     Refill:  1   • traZODone (DESYREL) 50 MG tablet     Sig: Take 1/2-1 tablet PO QHS PRN for sleep     Dispense:  30 tablet     Refill:  1   • lamoTRIgine (LaMICtal) 200 MG tablet     Sig: Take 1 tablet by mouth Daily.     Dispense:  30 tablet     Refill:  1       Return in about 8 weeks (around 6/14/2023), or if symptoms worsen or fail to improve, for Recheck.     Treatment plan completed: 3/7/23    Progress toward goal: Not at goal    Functional Status: Mild impairment     Prognosis: Fair with Ongoing Treatment         This document has been electronically signed by YARY Bush  April 19, 2023 13:58 EDT    Some of the data in this electronic note has been brought forward from a previous encounter, any necessary changes have been made, it has been reviewed by this APRN, and it is accurate.    Please note that portions of this note were completed with a voice recognition program. Efforts were made to edit dictation, but occasionally words are mistranscribed.

## 2023-04-21 ENCOUNTER — TELEPHONE (OUTPATIENT)
Dept: FAMILY MEDICINE CLINIC | Facility: CLINIC | Age: 63
End: 2023-04-21
Payer: COMMERCIAL

## 2023-04-21 NOTE — TELEPHONE ENCOUNTER
"  Caller: Vivian Galvez    Relationship: Self    Best call back number: [phone]    What medications are you currently taking:   Current Outpatient Medications on File Prior to Visit   Medication Sig Dispense Refill   • atorvastatin (LIPITOR) 20 MG tablet Take 1 tablet by mouth Every Night. 90 tablet 3   • buPROPion XL (Wellbutrin XL) 300 MG 24 hr tablet Take 1 tablet by mouth Every Morning. 30 tablet 1   • cyanocobalamin 1000 MCG/ML injection Inject 1 mL under the skin into the appropriate area as directed Every 30 (Thirty) Days. 10 mL 1   • Dulaglutide (Trulicity) 4.5 MG/0.5ML solution pen-injector Inject 0.5 mL under the skin into the appropriate area as directed 1 (One) Time Per Week. 6 mL 1   • FLUoxetine (PROzac) 20 MG capsule Take 1 capsule by mouth Daily. 30 capsule 1   • hydroCHLOROthiazide (HYDRODIURIL) 25 MG tablet Take 1 tablet by mouth Daily. 90 tablet 1   • lamoTRIgine (LaMICtal) 200 MG tablet Take 1 tablet by mouth Daily. 30 tablet 1   • lisinopril (PRINIVIL,ZESTRIL) 40 MG tablet Take 1 tablet by mouth Daily. 90 tablet 1   • metFORMIN ER (GLUCOPHAGE-XR) 750 MG 24 hr tablet Take 1 tablet by mouth Daily With Breakfast.     • Needles & Syringes (Easy Touch Syringe Barrel 1ml) misc 1 each Every 30 (Thirty) Days. 12 each 0   • Syringe 20G X 1\" 3 ML misc 1 each Daily. 10 each 5   • traZODone (DESYREL) 50 MG tablet Take 1/2-1 tablet PO QHS PRN for sleep 30 tablet 1   • valACYclovir (Valtrex) 500 MG tablet Take 1 tablet by mouth 2 (Two) Times a Day. For 3 days per episode 12 tablet 3   • vitamin D (ERGOCALCIFEROL) 1.25 MG (93635 UT) capsule capsule Take 1 capsule by mouth 1 (One) Time Per Week.       Current Facility-Administered Medications on File Prior to Visit   Medication Dose Route Frequency Provider Last Rate Last Admin   • cyanocobalamin injection 1,000 mcg  1,000 mcg Intramuscular Q30 Days Peyton Arechiga MD   1,000 mcg at 03/02/22 1504          When did you start taking these medications: " NA    Which medication are you concerned about: CYMBALTA     Who prescribed you this medication: [facility] (NOT [facility] TODAY)    What are your concerns: PATIENT STATES SHE IS WITHDRAWING FROM CYMBALTA AND HAVING TREMORS, SHAKING, HEADACHE, AND UPSET STOMACH. [facility] IS CLOSED TODAY SO SHE IS CALLING PCP TO SEE IF THERE IS ANYTHING SHE CAN DO

## 2023-04-21 NOTE — TELEPHONE ENCOUNTER
Patient reports she has been gradullay decreasing cymbalta for the past 2 weeks. She was prescribed prozac 20 mg on Wednesday    She reports ongoing withdrawal sx all week, tremor, headache and abdominal pain. She was unable to come in for an appt. She will be evaluated at [facility]

## 2023-04-24 ENCOUNTER — TELEPHONE (OUTPATIENT)
Dept: PSYCHIATRY | Facility: CLINIC | Age: 63
End: 2023-04-24
Payer: COMMERCIAL

## 2023-04-24 RX ORDER — DULOXETIN HYDROCHLORIDE 20 MG/1
CAPSULE, DELAYED RELEASE ORAL
Qty: 10 CAPSULE | Refills: 0 | Status: SHIPPED | OUTPATIENT
Start: 2023-04-24

## 2023-04-24 NOTE — PROGRESS NOTES
Pt called stating that she continues to have withdrawals from discontinuation of the Cymbalta 30 mg PO Daily. Pt states she is having shakes, upset stomach, and headaches. Advised pt to stop the Prozac temporarily. Start Cymbalta 20 mg PO Daily x7 days, then decrease to 20 mg PO every other day x7 days, then discontinue. Pt may then restart the Prozac 20 mg PO Daily. Pt verbalized understanding.

## 2023-04-24 NOTE — TELEPHONE ENCOUNTER
Pt states that she is trying to titrate down from Cymbalta, and shes having some horrible side effects/withdrawls. Pt states she is having shakes, upset stomach, and headaches.     Please advise.

## 2023-05-04 RX ORDER — DULOXETIN HYDROCHLORIDE 20 MG/1
CAPSULE, DELAYED RELEASE ORAL
Qty: 10 CAPSULE | Refills: 0 | OUTPATIENT
Start: 2023-05-04

## 2023-05-16 ENCOUNTER — TELEMEDICINE (OUTPATIENT)
Dept: PSYCHIATRY | Facility: CLINIC | Age: 63
End: 2023-05-16
Payer: COMMERCIAL

## 2023-05-16 DIAGNOSIS — F41.1 GENERALIZED ANXIETY DISORDER: Chronic | ICD-10-CM

## 2023-05-16 DIAGNOSIS — F33.1 MAJOR DEPRESSIVE DISORDER, RECURRENT EPISODE, MODERATE: Primary | Chronic | ICD-10-CM

## 2023-05-16 DIAGNOSIS — G47.9 SLEEP DIFFICULTIES: Chronic | ICD-10-CM

## 2023-05-16 PROCEDURE — 99214 OFFICE O/P EST MOD 30 MIN: CPT | Performed by: NURSE PRACTITIONER

## 2023-05-16 RX ORDER — AMOXICILLIN AND CLAVULANATE POTASSIUM 875; 125 MG/1; MG/1
TABLET, FILM COATED ORAL
COMMUNITY
Start: 2023-05-12

## 2023-05-16 RX ORDER — GALCANEZUMAB 120 MG/ML
INJECTION, SOLUTION SUBCUTANEOUS
COMMUNITY
Start: 2023-04-24

## 2023-05-16 RX ORDER — FLUTICASONE PROPIONATE 50 MCG
SPRAY, SUSPENSION (ML) NASAL
COMMUNITY
Start: 2023-05-12

## 2023-05-16 NOTE — PROGRESS NOTES
This provider is completing this appointment through [facility] (through [facility]), [address], [facility], [address] using a secure [employer] Video Visit through [employer]. Patient is being seen remotely via telehealth in [place], and stated they are in a secure environment for this session. The patient's condition being diagnosed/treated is appropriate for telemedicine. The provider identified herself as well as her credentials.   The patient, and/or patients guardian, consent to be seen remotely, and when consent is given they understand that the consent allows for patient identifiable information to be sent to a third party as needed.   They may refuse to be seen remotely at any time. The electronic data is encrypted and password protected, and the patient and/or guardian has been advised of the potential risks to privacy not withstanding such measures.    You have chosen to receive care through a telehealth visit.  Do you consent to use a video/audio connection for your medical care today? Yes    Patient identifiers utilized: Name and date of birth.        Pablo Galvez is a 63 y.o. female who presents today for follow up    Chief Complaint:  Depression, anxiety, sleep difficulties    Accompanied by:Pt was alone for duration of appointment    History of Present Illness:   *Pt was last seen by this APRN on 4/19/23. Pt called on 4/24/23 stating that she continues to have withdrawals from discontinuation of the Cymbalta 30 mg PO Daily. Pt stated she is having shakes, upset stomach, and headaches. Advised pt to stop the Prozac temporarily. Start Cymbalta 20 mg PO Daily x7 days, then decrease to 20 mg PO every other day x7 days, then discontinue. Pt may then restart the Prozac 20 mg PO Daily. Pt verbalized understanding.*    Pt reports the worst of the withdrawal symptoms from Cymbalta have dissipated. She does still feel shaky in the mornings upon awakening.  Pt has been back on the Prozac for about a week. She hasn't noticed any difference in her mood as of yet. Some days she feels better than others. She is averaging 4 good days per week. On her bad days, she will feel overwhelmed, poor energy and motivation, and more emotional. Pt is no longer taking Adderall XR; she ran out about two weeks ago. Pt is taking Trazodone 25 mg PO QHS and sleep is stable. She wakes up feeling rested. Appetite has been normal. Pt is going on vacation to [place] starting May 28th and will return on June 4th. Pt is looking forward to her upcoming trip with her family. Pt has been spending time outside in her yard. She wants to give herself more time to feel better before she starts looking for a job. The patient denies any new medical problems or changes in medications since last appointment with this facility with the exception of a sinus infection for which she received an antibiotic. The patient reports compliance with current medication regimen. The patient denies any current side effects from their current medication regimen. The patient denies any abnormal muscle movements or tics. The patient rates their depression on average in the past week at a 5/10 on a 0-10 scale, with 10 being the worst. The patient rates their anxiety on average the past week at a 3/10 on a 0-10 scale, with 10 being the worst. The patient would like to not adjust or change their medications at this visit. The patient denies any suicidal or homicidal ideations, plans, or intent at today's encounter and is convincing. The patient denies any auditory hallucinations or visual hallucinations. The patient does not endorse any significant symptoms consistent with mitra or psychosis at today's encounter.     *If the patient has any concerns or needs assistance, they may call the [facility] at [phone]*          Prior Psychiatric Medications:  Lamictal   Cymbalta  Adderall XR - was  prescribed for concentration and fatigue; helped with mood and energy level  Prozac - helpful for about five years  Effexor XR - weight gain and didn't help as much         The following portions of the patient's history were reviewed and updated as appropriate: allergies, current medications, past family history, past medical history, past social history, past surgical history and problem list.          Past Medical History:  Past Medical History:   Diagnosis Date   • ADHD (attention deficit hyperactivity disorder)    • Anxiety    • Colon polyp    • Depression    • Diabetes mellitus    • Dyslipidemia    • Gall stones    • Gallstone pancreatitis    • GERD (gastroesophageal reflux disease)    • Gout    • Hypertension    • Migraines    • Vitamin D deficiency        Social History:  Social History     Socioeconomic History   • Marital status: Single   Tobacco Use   • Smoking status: Never   • Smokeless tobacco: Never   Vaping Use   • Vaping Use: Never used   Substance and Sexual Activity   • Alcohol use: Not Currently   • Drug use: Never   • Sexual activity: Not Currently     Partners: Male     Birth control/protection: Tubal ligation       Family History:  Family History   Problem Relation Age of Onset   • Breast cancer Mother    • Hypertension Mother    • Diabetes Paternal Grandmother    • Anxiety disorder Daughter    • Depression Daughter    • Thalassemia Daughter    • Hashimoto's thyroiditis Daughter    • Neuropathy Daughter    • Goiter Daughter    • Eczema Daughter        Past Surgical History:  Past Surgical History:   Procedure Laterality Date   • CHOLECYSTECTOMY  1986   • COLONOSCOPY     • TONSILLECTOMY     • TUBAL ABDOMINAL LIGATION     • WISDOM TOOTH EXTRACTION         Problem List:  Patient Active Problem List   Diagnosis   • FH: breast cancer   • H/O colonoscopy with polypectomy   • Fecal smearing   • Dyslipidemia   • Vitamin D deficiency   • Type 2 diabetes mellitus without complication, without long-term  "current use of insulin   • Primary hypertension   • Other fatigue   • Herpes simplex vulvovaginitis       Allergy:   No Known Allergies     Current Medications:   Current Outpatient Medications   Medication Sig Dispense Refill   • amoxicillin-clavulanate (AUGMENTIN) 875-125 MG per tablet      • atorvastatin (LIPITOR) 20 MG tablet Take 1 tablet by mouth Every Night. 90 tablet 3   • buPROPion XL (Wellbutrin XL) 300 MG 24 hr tablet Take 1 tablet by mouth Every Morning. 30 tablet 1   • cyanocobalamin 1000 MCG/ML injection Inject 1 mL under the skin into the appropriate area as directed Every 30 (Thirty) Days. 10 mL 1   • Dulaglutide (Trulicity) 4.5 MG/0.5ML solution pen-injector Inject 0.5 mL under the skin into the appropriate area as directed 1 (One) Time Per Week. 6 mL 1   • Emgality 120 MG/ML auto-injector pen      • FLUoxetine (PROzac) 20 MG capsule Take 1 capsule by mouth Daily. 30 capsule 1   • fluticasone (FLONASE) 50 MCG/ACT nasal spray      • hydroCHLOROthiazide (HYDRODIURIL) 25 MG tablet Take 1 tablet by mouth Daily. 90 tablet 1   • lamoTRIgine (LaMICtal) 200 MG tablet Take 1 tablet by mouth Daily. 30 tablet 1   • lisinopril (PRINIVIL,ZESTRIL) 40 MG tablet Take 1 tablet by mouth Daily. 90 tablet 1   • metFORMIN ER (GLUCOPHAGE-XR) 750 MG 24 hr tablet Take 1 tablet by mouth Daily With Breakfast.     • Needles & Syringes (Easy Touch Syringe Barrel 1ml) misc 1 each Every 30 (Thirty) Days. 12 each 0   • Syringe 20G X 1\" 3 ML misc 1 each Daily. 10 each 5   • traZODone (DESYREL) 50 MG tablet Take 1/2-1 tablet PO QHS PRN for sleep 30 tablet 1   • valACYclovir (Valtrex) 500 MG tablet Take 1 tablet by mouth 2 (Two) Times a Day. For 3 days per episode 12 tablet 3   • vitamin D (ERGOCALCIFEROL) 1.25 MG (20042 UT) capsule capsule Take 1 capsule by mouth 1 (One) Time Per Week.       Current Facility-Administered Medications   Medication Dose Route Frequency Provider Last Rate Last Admin   • cyanocobalamin injection 1,000 " mcg  1,000 mcg Intramuscular Q30 Days Peyton Arechiga MD   1,000 mcg at 03/02/22 1504       Review of Symptoms:    Review of Systems   Constitutional: Positive for fatigue.   Psychiatric/Behavioral: Positive for depressed mood. The patient is nervous/anxious.          Physical Exam:   Due to the remote nature of this encounter (virtual encounter), vitals were unable to be obtained.  Height stated at 61 inches.  Weight stated at 170 pounds.      Physical Exam  Neurological:      Mental Status: She is alert.   Psychiatric:         Attention and Perception: Attention and perception normal. She does not perceive auditory or visual hallucinations.         Mood and Affect: Affect normal.         Speech: Speech normal.         Behavior: Behavior normal. Behavior is cooperative.         Thought Content: Thought content normal. Thought content is not paranoid or delusional. Thought content does not include homicidal or suicidal ideation. Thought content does not include homicidal or suicidal plan.         Cognition and Memory: Cognition and memory normal.         Judgment: Judgment normal.      Comments: Mildly sad mood today           Mental Status Exam:   Hygiene:   good  Cooperation:  Cooperative  Eye Contact:  Good  Psychomotor Behavior:  Appropriate  Affect:  Appropriate  Mood: sad  Speech:  Normal  Thought Process:  Goal directed  Thought Content:  Normal  Suicidal:  None  Homicidal:  None  Hallucinations:  None  Delusion:  None  Memory:  Intact  Orientation:  Person, Place, Time and Situation  Reliability:  good  Insight:  Good  Judgement:  Good  Impulse Control:  Good       PHYLICIA-7  Feeling nervous, anxious or on edge: (P) Several days  Not being able to stop or control worrying: (P) Several days  Worrying too much about different things: (P) Several days  Trouble Relaxing: (P) Several days  Being so restless that it is hard to sit still: (P) Not at all  Feeling afraid as if something awful might happen: (P) Not at  all  Becoming easily annoyed or irritable: (P) More than half the days  PHYLICIA 7 Total Score: (P) 6  If you checked any problems, how difficult have these problems made it for you to do your work, take care of things at home, or get along with other people: (P) Somewhat difficult      PROMIS scale screening tool that patient filled out virtually prior to encounter beginning reviewed by this APRN at today's encounter.    Previous Provider notes and available records reviewed by this APRN at today's encounter.       Lab Results:   No visits with results within 1 Month(s) from this visit.   Latest known visit with results is:   Office Visit on 03/14/2023   Component Date Value Ref Range Status   • Hemoglobin A1C 03/14/2023 6.4  % Final   • Lot Number 03/14/2023 [id number]   Final   • Expiration Date 03/14/2023 11/29/2024   Final         Assessment & Plan   Problems Addressed this Visit    None  Visit Diagnoses     Major depressive disorder, recurrent episode, moderate  (Chronic)   -  Primary    Generalized anxiety disorder  (Chronic)       Sleep difficulties  (Chronic)         Diagnoses       Codes Comments    Major depressive disorder, recurrent episode, moderate    -  Primary ICD-10-CM: F33.1  ICD-9-CM: 296.32     Generalized anxiety disorder     ICD-10-CM: F41.1  ICD-9-CM: 300.02     Sleep difficulties     ICD-10-CM: G47.9  ICD-9-CM: 780.50           Visit Diagnoses:    ICD-10-CM ICD-9-CM   1. Major depressive disorder, recurrent episode, moderate  F33.1 296.32   2. Generalized anxiety disorder  F41.1 300.02   3. Sleep difficulties  G47.9 780.50          GOALS:  Short Term Goals: Patient will be compliant with medication, and patient will have no significant medication related side effects.  Patient will be engaged in psychotherapy as indicated.  Patient will report subjective improvement of symptoms.  Long term goals: To stabilize mood and treat/improve subjective symptoms, the patient will stay out of the hospital,  the patient will be at an optimal level of functioning, and the patient will take all medications as prescribed.  The patient verbalized understanding and agreement with goals that were mutually set.      TREATMENT PLAN:   Continue supportive psychotherapy efforts and medications as indicated.   -Continue Wellbutrin  mg PO QAM for depression and anxiety  -Continue Prozac 20 mg PO Daily for depression and anxiety  -Continue Trazodone 25-50 mg PO QHS PRN for sleep  -Continue Lamictal 200 mg PO Daily for mood stabilization   -Discontinue Cymbalta    *Pt was initially started on Lamictal to help control mood swings, but was never diagnosed with bipolar disorder. If pt does well on other medication, the hope is to slowly taper her off Lamictal.*     This APRN has discussed the benefits and risks of taking/continuing Lamictal (Lamotrigine).  The side effects of Lamictal can include a benign rash, blurred or double vision, dizziness, ataxia, sedation, headache, tremor, insomnia, poor coordination, fatigue,  nausea, vomiting, dyspepsia, rhinitis, infection, pharyngitis, asthenia, a rare but serious rash, rare multi-organ failure associated with Reese-Venkatesh Syndrome, toxic epidermal necrolysis, drug hypersensitivity syndrome, rare blood dyscrasias, rare aseptic meningitis, rare sudden unexplained deaths in people with epilepsy, withdrawal seizures upon abrupt withdrawal, and rare activation of suicidal ideation and behavior (suicidality).  This APRN has discussed that a very slow dose titration when starting, or changing doses, of Lamictal may reduce the incidence of skin rash and other side effects.  The dosage should not be titrated upwards or increased faster than recommended due to the possibility of the discussed side effects and risk of development of a skin rash (which can become life threatening).    This APRN has also discussed that if the patient stops taking the Lamictal for 3-5 days or longer, it will  be necessary to restart the drug with an initial dose titration, as rashes have been reported on reexposure.  If the patient and Provider decide to stop the Lamictal, the patient will follow the directions of this APRN/this office as a guided taper over about two weeks is appropriate due to the risk of relapse in bipolar disorder with those with a mood or bipolar disorder, the risk of seizures in those with epilepsy, and discontinuation symptoms upon rapid discontinuation of Lamictal.    The patient verbalizes understanding of benefits and risks as discussed, the patient/guardian feels the benefits outweigh the risks and is agreeable to continue/take Lamictal as discussed.  The patient is advised should any side effects or rash develops they are to stop the Lamictal immediately and contact this APRN/this office or go to the emergency department immediately.  The patient verbalizes understanding and agreement with treatment plan in their own words.    Medication and treatment options, both pharmacological and non-pharmacological treatment options, discussed during today's visit, including any off label use of medication. Patient acknowledged and verbally consented with current treatment plan and was educated on the importance of compliance with treatment and follow-up appointments.       MEDICATION ISSUES:    Discussed treatment plan and medication options of prescribed medication as well as the risks, benefits, any black box warnings, and side effects including potential falls, possible impaired driving, and metabolic adversities among others, including any off label use of medication. Patient is agreeable to call the office with any worsening of symptoms or onset of side effects, or if any concerns or questions arise.  The contact information for the office is made available to the patient. They may call the [facility] at [phone]. Patient is agreeable to call 531 or go to the  nearest ER should they begin having any SI/HI, or if any urgent concerns arise.        SUICIDE RISK ASSESSMENT: Unalterable demographics and a history of mental health intervention indicate this patient is in a high risk category compared to the general population. At present, the patient denies active SI/HI, intentions, or plans at this time and agrees to seek immediate care should such thoughts develop. The patient verbalizes understanding of how to access emergency care if needed and agrees to do so. Consideration of suicide risk and protective factors such as history, current presentation, individual strengths and weaknesses, psychosocial and environmental stressors and variables, psychiatric illness and symptoms, medical conditions and pain, took place in this interview. Based on those considerations, the patient is determined: within individual baseline and presenting no imminent risk for suicide or homicide. Other recommendations: The patient does not meet the criteria for inpatient admission and is not a safety risk to self or others at today's visit. Inpatient treatment offers no significant advantages over outpatient treatment for this patient at today's visit.      SAFETY PLAN:  Patient was given ample time for questions and fully participated in treatment planning.  Patient was encouraged to call the clinic with any questions or concerns.  Patient was informed of access to emergency care. If patient were to develop any significant symptomatology, suicidal ideation, homicidal ideation, any concerns, or feel unsafe at any time they are to call the clinic and if unable to get immediate assistance should immediately call 911 or go to the nearest emergency room.  The patient is advised to remove or secure (lock away) all lethal weapons (including guns) and sharps (including razors, scissors, knives, etc.).  All medications (including any prescribed and any over the counter medications) should be stored in a  safe and secured location that is not obtainable by children/adolescents.  Patient was given an opportunity and encouraged to ask questions about their medication, illness, and treatment. Patient contracted verbally for the following: If you are experiencing an emotional crisis or have thoughts of harming yourself or others, please go to your nearest local emergency room or call 911. Will continue to re-assess medication response and side effects frequently to establish efficacy and ensure safety. Risks, any black box warnings, side effects, off label usage, and benefits of medication and treatment discussed with patient, along with potential adverse side effects of current and/or newly prescribed medication, alternative treatment options, and OTC medications.  Patient verbalized understanding of potential risks, any off label use of medication, any black box warnings, and any side effects in their own words. The patient verbalized understanding and agreed to comply with the safety plan discussed in their own words.  Patient given the number to the office. Number also available to the 24- hour suicide hotline.      MEDS ORDERED DURING VISIT:  No orders of the defined types were placed in this encounter.      Return in about 4 weeks (around 6/13/2023), or if symptoms worsen or fail to improve, for Recheck.     Treatment plan completed: 3/7/23    Progress toward goal: Not at goal    Functional Status: Mild impairment     Prognosis: Fair with Ongoing Treatment         This document has been electronically signed by YARY Bush  May 16, 2023 15:21 EDT    Some of the data in this electronic note has been brought forward from a previous encounter, any necessary changes have been made, it has been reviewed by this APRN, and it is accurate.    Please note that portions of this note were completed with a voice recognition program. Efforts were made to edit dictation, but occasionally words are mistranscribed.

## 2023-06-06 ENCOUNTER — TELEMEDICINE (OUTPATIENT)
Dept: PSYCHIATRY | Facility: CLINIC | Age: 63
End: 2023-06-06
Payer: COMMERCIAL

## 2023-06-06 DIAGNOSIS — F41.1 GENERALIZED ANXIETY DISORDER: Chronic | ICD-10-CM

## 2023-06-06 DIAGNOSIS — F33.1 MAJOR DEPRESSIVE DISORDER, RECURRENT EPISODE, MODERATE: Primary | Chronic | ICD-10-CM

## 2023-06-06 DIAGNOSIS — G47.9 SLEEP DIFFICULTIES: Chronic | ICD-10-CM

## 2023-06-06 PROCEDURE — 99214 OFFICE O/P EST MOD 30 MIN: CPT | Performed by: NURSE PRACTITIONER

## 2023-06-06 RX ORDER — LAMOTRIGINE 200 MG/1
200 TABLET ORAL DAILY
Qty: 30 TABLET | Refills: 1 | Status: SHIPPED | OUTPATIENT
Start: 2023-06-06

## 2023-06-06 RX ORDER — FLUOXETINE HYDROCHLORIDE 40 MG/1
40 CAPSULE ORAL DAILY
Qty: 30 CAPSULE | Refills: 1 | Status: SHIPPED | OUTPATIENT
Start: 2023-06-06

## 2023-06-06 RX ORDER — BUPROPION HYDROCHLORIDE 300 MG/1
300 TABLET ORAL EVERY MORNING
Qty: 30 TABLET | Refills: 1 | Status: SHIPPED | OUTPATIENT
Start: 2023-06-06
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
## 2023-06-11 DIAGNOSIS — A60.04 HERPES SIMPLEX VULVOVAGINITIS: ICD-10-CM

## 2023-06-11 DIAGNOSIS — I10 PRIMARY HYPERTENSION: ICD-10-CM

## 2023-06-11 DIAGNOSIS — E53.8 B12 DEFICIENCY: ICD-10-CM

## 2023-06-11 RX ORDER — SYRINGE, DISPOSABLE, 1 ML
1 SYRINGE, EMPTY DISPOSABLE MISCELLANEOUS
Qty: 12 EACH | Refills: 0 | Status: CANCELLED | OUTPATIENT
Start: 2023-06-11

## 2023-06-12 RX ORDER — VALACYCLOVIR HYDROCHLORIDE 500 MG/1
500 TABLET, FILM COATED ORAL 2 TIMES DAILY
Qty: 12 TABLET | Refills: 3 | Status: SHIPPED | OUTPATIENT
Start: 2023-06-12

## 2023-06-12 RX ORDER — LISINOPRIL 40 MG/1
40 TABLET ORAL DAILY
Qty: 90 TABLET | Refills: 1 | Status: SHIPPED | OUTPATIENT
Start: 2023-06-12

## 2023-06-12 NOTE — TELEPHONE ENCOUNTER
Rx Refill Note  Requested Prescriptions     Pending Prescriptions Disp Refills    valACYclovir (Valtrex) 500 MG tablet 12 tablet 3     Sig: Take 1 tablet by mouth 2 (Two) Times a Day. For 3 days per episode      Last office visit with prescribing clinician: 3/14/2023   Last telemedicine visit with prescribing clinician: Visit date not found   Next office visit with prescribing clinician: 6/14/2023                         Would you like a call back once the refill request has been completed: [] Yes [] No    If the office needs to give you a call back, can they leave a voicemail: [] Yes [] No    Kennedy Pimentel MA  06/12/23, 08:20 EDT

## 2023-06-12 NOTE — TELEPHONE ENCOUNTER
Rx Refill Note  Requested Prescriptions     Pending Prescriptions Disp Refills    Needles & Syringes (Easy Touch Syringe Barrel 1ml) misc 12 each 0     Si each Every 30 (Thirty) Days.      Last office visit with prescribing clinician: 3/14/2023   Last telemedicine visit with prescribing clinician: Visit date not found   Next office visit with prescribing clinician: 2023                         Would you like a call back once the refill request has been completed: [] Yes [] No    If the office needs to give you a call back, can they leave a voicemail: [] Yes [] No    Kennedy Pimentel MA  23, 08:18 EDT

## 2023-06-12 NOTE — TELEPHONE ENCOUNTER
Rx Refill Note  Requested Prescriptions     Pending Prescriptions Disp Refills    lisinopril (PRINIVIL,ZESTRIL) 40 MG tablet 90 tablet 1     Sig: Take 1 tablet by mouth Daily.      Last office visit with prescribing clinician: 3/14/2023   Last telemedicine visit with prescribing clinician: Visit date not found   Next office visit with prescribing clinician: 6/14/2023                         Would you like a call back once the refill request has been completed: [] Yes [] No    If the office needs to give you a call back, can they leave a voicemail: [] Yes [] No    Kennedy Pimentel MA  06/12/23, 08:19 EDT

## 2023-06-15 DIAGNOSIS — G47.9 SLEEP DIFFICULTIES: ICD-10-CM

## 2023-06-15 RX ORDER — TRAZODONE HYDROCHLORIDE 50 MG/1
TABLET ORAL
Qty: 30 TABLET | Refills: 1 | Status: SHIPPED | OUTPATIENT
Start: 2023-06-15

## 2023-06-19 RX ORDER — ERGOCALCIFEROL 1.25 MG/1
CAPSULE ORAL
Qty: 12 CAPSULE | OUTPATIENT
Start: 2023-06-19

## 2023-08-03 ENCOUNTER — TELEMEDICINE (OUTPATIENT)
Dept: PSYCHIATRY | Facility: CLINIC | Age: 63
End: 2023-08-03
Payer: COMMERCIAL

## 2023-08-03 DIAGNOSIS — F41.1 GENERALIZED ANXIETY DISORDER: Chronic | ICD-10-CM

## 2023-08-03 DIAGNOSIS — G47.9 SLEEP DIFFICULTIES: Chronic | ICD-10-CM

## 2023-08-03 DIAGNOSIS — F33.1 MAJOR DEPRESSIVE DISORDER, RECURRENT EPISODE, MODERATE: Primary | Chronic | ICD-10-CM

## 2023-08-03 PROCEDURE — 99214 OFFICE O/P EST MOD 30 MIN: CPT | Performed by: NURSE PRACTITIONER

## 2023-08-03 RX ORDER — LAMOTRIGINE 200 MG/1
200 TABLET ORAL DAILY
Qty: 30 TABLET | Refills: 1 | Status: SHIPPED | OUTPATIENT
Start: 2023-08-03

## 2023-08-03 RX ORDER — ESCITALOPRAM OXALATE 20 MG/1
20 TABLET ORAL DAILY
Qty: 30 TABLET | Refills: 1 | Status: SHIPPED | OUTPATIENT
Start: 2023-08-03

## 2023-08-03 RX ORDER — BUPROPION HYDROCHLORIDE 300 MG/1
300 TABLET ORAL EVERY MORNING
Qty: 30 TABLET | Refills: 1 | Status: SHIPPED | OUTPATIENT
Start: 2023-08-03

## 2023-08-18 ENCOUNTER — PATIENT MESSAGE (OUTPATIENT)
Dept: FAMILY MEDICINE CLINIC | Facility: CLINIC | Age: 63
End: 2023-08-18
Payer: COMMERCIAL

## 2023-08-18 DIAGNOSIS — F90.9 ATTENTION DEFICIT HYPERACTIVITY DISORDER (ADHD), UNSPECIFIED ADHD TYPE: Primary | ICD-10-CM

## 2023-08-25 DIAGNOSIS — G47.9 SLEEP DIFFICULTIES: ICD-10-CM

## 2023-08-28 RX ORDER — TRAZODONE HYDROCHLORIDE 50 MG/1
TABLET ORAL
Qty: 30 TABLET | Refills: 1 | Status: SHIPPED | OUTPATIENT
Start: 2023-08-28

## 2023-08-30 ENCOUNTER — TELEMEDICINE (OUTPATIENT)
Dept: PSYCHIATRY | Facility: CLINIC | Age: 63
End: 2023-08-30
Payer: COMMERCIAL

## 2023-08-30 DIAGNOSIS — G47.9 SLEEP DIFFICULTIES: Chronic | ICD-10-CM

## 2023-08-30 DIAGNOSIS — F41.1 GENERALIZED ANXIETY DISORDER: Chronic | ICD-10-CM

## 2023-08-30 DIAGNOSIS — F33.1 MAJOR DEPRESSIVE DISORDER, RECURRENT EPISODE, MODERATE: Primary | Chronic | ICD-10-CM

## 2023-08-30 PROCEDURE — 99214 OFFICE O/P EST MOD 30 MIN: CPT | Performed by: NURSE PRACTITIONER

## 2023-08-30 RX ORDER — ESCITALOPRAM OXALATE 20 MG/1
TABLET ORAL
Qty: 45 TABLET | Refills: 1 | Status: SHIPPED | OUTPATIENT
Start: 2023-08-30

## 2023-08-30 NOTE — PROGRESS NOTES
This provider is completing this appointment through [facility] (through [facility]), [address], [facility], [address] using a secure [employer] Video Visit through [employer]. Patient is being seen remotely via telehealth in [place], and stated they are in a secure environment for this session. The patient's condition being diagnosed/treated is appropriate for telemedicine. The provider identified herself as well as her credentials.   The patient, and/or patients guardian, consent to be seen remotely, and when consent is given they understand that the consent allows for patient identifiable information to be sent to a third party as needed.   They may refuse to be seen remotely at any time. The electronic data is encrypted and password protected, and the patient and/or guardian has been advised of the potential risks to privacy not withstanding such measures.    You have chosen to receive care through a telehealth visit.  Do you consent to use a video/audio connection for your medical care today? Yes    Patient identifiers utilized: Name and date of birth.        Pablo Galvez is a 63 y.o. female who presents today for follow up    Chief Complaint:  Depression, anxiety, sleep difficulties    Accompanied by:Pt was alone for duration of appointment    History of Present Illness:   Pt reports she is doing better overall. She believes it's the medication because there hasn't been any changes in her life. Pt has noticed she has a more positive attitude and isn't dwelling on things. Pt states she had so many negative situations occur at once it was difficult to see beyond it. Pt reports she enjoys being outside, but it's been too hot to do much. Pt continues to sleep well on the Trazodone. She is averaging about eight hours. Appetite has been stable, but feels she has gained a few lbs. Pt is unsure if it's the medication or being less active due to the heat. 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 Pt hasn't felt up to looking for employment. She still lacks confidence and feels it would have to be the right job to go back right now. Pt states she would like to restart Adderall because it was very helpful. This APRN explained upcoming KARISSA changes and the need for psychological testing. Recommended pt see an in-person provider and provided pt with a list of resources she can contact. The patient denies any new medical problems since last appointment with this facility. The patient reports compliance with current medication regimen. The patient denies any current side effects from their current medication regimen. The patient denies any abnormal muscle movements or tics. The patient rates their depression on average in the past week at a 4-5/10 on a 0-10 scale, with 10 being the worst. The patient rates their anxiety on average the past week at a 3/10 on a 0-10 scale, with 10 being the worst. The patient would like to increase their medications at this visit. The patient denies any suicidal or homicidal ideations, plans, or intent at today's encounter and is convincing. The patient denies any auditory hallucinations or visual hallucinations. The patient does not endorse any significant symptoms consistent with mitra or psychosis at today's encounter.     *If the patient has any concerns or needs assistance, they may call the [facility] at [phone]*        Prior Psychiatric Medications:  Lamictal   Cymbalta  Adderall XR - was prescribed for concentration and fatigue; helped with mood and energy level  Prozac - helpful for about five years, up to 40 mg second time it was tried and ineffective  Effexor XR - weight gain and didn't help as much         The following portions of the patient's history were reviewed and updated as appropriate: allergies, current medications, past family history, past medical history, past social history,  past surgical history and problem list.          Past Medical History:  Past Medical History:   Diagnosis Date    ADHD (attention deficit hyperactivity disorder)     Anxiety     Colon polyp     Depression     Diabetes mellitus     Dyslipidemia     Gall stones     Gallstone pancreatitis     GERD (gastroesophageal reflux disease)     Gout     Hypertension     Migraines     Vitamin D deficiency        Social History:  Social History     Socioeconomic History    Marital status: Single   Tobacco Use    Smoking status: Never    Smokeless tobacco: Never   Vaping Use    Vaping Use: Never used   Substance and Sexual Activity    Alcohol use: Not Currently    Drug use: Never    Sexual activity: Not Currently     Partners: Male     Birth control/protection: Tubal ligation       Family History:  Family History   Problem Relation Age of Onset    Breast cancer Mother     Hypertension Mother     Diabetes Paternal Grandmother     Anxiety disorder Daughter     Depression Daughter     Thalassemia Daughter     Hashimoto's thyroiditis Daughter     Neuropathy Daughter     Goiter Daughter     Eczema Daughter        Past Surgical History:  Past Surgical History:   Procedure Laterality Date    CHOLECYSTECTOMY  1986    COLONOSCOPY      TONSILLECTOMY      TUBAL ABDOMINAL LIGATION      WISDOM TOOTH EXTRACTION         Problem List:  Patient Active Problem List   Diagnosis    FH: breast cancer    H/O colonoscopy with polypectomy    Fecal smearing    Dyslipidemia    Vitamin D deficiency    Type 2 diabetes mellitus without complication, without long-term current use of insulin    Primary hypertension    Other fatigue    Herpes simplex vulvovaginitis       Allergy:   No Known Allergies     Current Medications:   Current Outpatient Medications   Medication Sig Dispense Refill    atorvastatin (LIPITOR) 20 MG tablet Take 1 tablet by mouth Every Night. 90 tablet 3    buPROPion XL (Wellbutrin XL) 300 MG 24 hr tablet Take 1 tablet by mouth Every Morning.  "30 tablet 1    cyanocobalamin 1000 MCG/ML injection Inject 1 mL under the skin into the appropriate area as directed Every 30 (Thirty) Days. 10 mL 1    Dulaglutide (Trulicity) 4.5 MG/0.5ML solution pen-injector Inject 0.5 mL under the skin into the appropriate area as directed 1 (One) Time Per Week. 6 mL 1    Emgality 120 MG/ML auto-injector pen       escitalopram (Lexapro) 20 MG tablet Take 1.5 tablets PO Daily 45 tablet 1    fluticasone (FLONASE) 50 MCG/ACT nasal spray       hydroCHLOROthiazide (HYDRODIURIL) 25 MG tablet Take 1 tablet by mouth Daily. 90 tablet 1    lamoTRIgine (LaMICtal) 200 MG tablet Take 1 tablet by mouth Daily. 30 tablet 1    lisinopril (PRINIVIL,ZESTRIL) 40 MG tablet Take 1 tablet by mouth Daily. 90 tablet 1    metFORMIN ER (GLUCOPHAGE-XR) 750 MG 24 hr tablet Take 1 tablet by mouth Daily With Breakfast.      Needles & Syringes (Easy Touch Syringe Barrel 1ml) misc 1 each Every 30 (Thirty) Days. 12 each 0    Syringe 20G X 1\" 3 ML misc 1 each Daily. 10 each 5    traZODone (DESYREL) 50 MG tablet TAKE 1/2 TO 1 TABLET BY MOUTH EVERY NIGHT AT BEDTIME AS NEEDED FOR SLEEP 30 tablet 1    valACYclovir (Valtrex) 500 MG tablet Take 1 tablet by mouth 2 (Two) Times a Day. For 3 days per episode 12 tablet 3    vitamin D (ERGOCALCIFEROL) 1.25 MG (43535 UT) capsule capsule Take 1 capsule by mouth 1 (One) Time Per Week.       Current Facility-Administered Medications   Medication Dose Route Frequency Provider Last Rate Last Admin    cyanocobalamin injection 1,000 mcg  1,000 mcg Intramuscular Q30 Days Peyton Arechiga MD   1,000 mcg at 03/02/22 1504       Review of Symptoms:    Review of Systems   Constitutional:  Positive for fatigue.   Psychiatric/Behavioral:  Positive for depressed mood.        Physical Exam:   Due to the remote nature of this encounter (virtual encounter), vitals were unable to be obtained.  Height stated at 61 inches.  Weight stated at 174 pounds.      Physical Exam  Neurological:      " Mental Status: She is alert.   Psychiatric:         Attention and Perception: Attention and perception normal. She does not perceive auditory or visual hallucinations.         Mood and Affect: Mood and affect normal.         Speech: Speech normal.         Behavior: Behavior normal. Behavior is cooperative.         Thought Content: Thought content normal. Thought content is not paranoid or delusional. Thought content does not include homicidal or suicidal ideation. Thought content does not include homicidal or suicidal plan.         Cognition and Memory: Cognition and memory normal.         Judgment: Judgment normal.         Mental Status Exam:   Hygiene:   good  Cooperation:  Cooperative  Eye Contact:  Good  Psychomotor Behavior:  Appropriate  Affect:  Appropriate  Mood: normal  Speech:  Normal  Thought Process:  Linear  Thought Content:  Normal  Suicidal:  None  Homicidal:  None  Hallucinations:  None  Delusion:  None  Memory:  Intact  Orientation:  Person, Place, Time and Situation  Reliability:  fair  Insight:  Fair  Judgement:  Fair  Impulse Control:  Good         Previous Provider notes and available records reviewed by this APRN at today's encounter.         Lab Results:   No visits with results within 1 Month(s) from this visit.   Latest known visit with results is:   Office Visit on 07/07/2023   Component Date Value Ref Range Status    Hemoglobin A1C 07/07/2023 6.2  % Final    Lot Number 07/07/2023 [id number]   Final    Expiration Date 07/07/2023 2/26/2025   Final         Assessment & Plan   Problems Addressed this Visit    None  Visit Diagnoses       Major depressive disorder, recurrent episode, moderate  (Chronic)   -  Primary    Relevant Medications    escitalopram (Lexapro) 20 MG tablet    Generalized anxiety disorder  (Chronic)       Relevant Medications    escitalopram (Lexapro) 20 MG tablet    Sleep difficulties  (Chronic)             Diagnoses         Codes Comments    Major depressive disorder,  recurrent episode, moderate    -  Primary ICD-10-CM: F33.1  ICD-9-CM: 296.32     Generalized anxiety disorder     ICD-10-CM: F41.1  ICD-9-CM: 300.02     Sleep difficulties     ICD-10-CM: G47.9  ICD-9-CM: 780.50             Visit Diagnoses:    ICD-10-CM ICD-9-CM   1. Major depressive disorder, recurrent episode, moderate  F33.1 296.32   2. Generalized anxiety disorder  F41.1 300.02   3. Sleep difficulties  G47.9 780.50          GOALS:  Short Term Goals: Patient will be compliant with medication, and patient will have no significant medication related side effects.  Patient will be engaged in psychotherapy as indicated.  Patient will report subjective improvement of symptoms.  Long term goals: To stabilize mood and treat/improve subjective symptoms, the patient will stay out of the hospital, the patient will be at an optimal level of functioning, and the patient will take all medications as prescribed.  The patient verbalized understanding and agreement with goals that were mutually set.      TREATMENT PLAN:   Continue supportive psychotherapy efforts and medications as indicated.   -Continue Wellbutrin  mg PO QAM for depression and anxiety  -Increase Lexapro to 30 mg PO Daily for depression and anxiety  -Continue Trazodone 25-50 mg PO QHS PRN for sleep  -Continue Lamictal 200 mg PO Daily for mood stabilization   -Pt would like to restart the Adderall her previous provider was prescribing her. This APRN explained upcoming KARISSA changes and the need for psychological testing. Recommended pt see an in-person provider and provided pt with a list of resources she can contact via [employer]    *Pt was initially started on Lamictal to help control mood swings, but was never diagnosed with bipolar disorder. If pt does well on other medication, the hope is to slowly taper her off Lamictal.*     This APRN has discussed the benefits and risks of taking/continuing Lamictal (Lamotrigine).  The side effects of Lamictal can  include a benign rash, blurred or double vision, dizziness, ataxia, sedation, headache, tremor, insomnia, poor coordination, fatigue,  nausea, vomiting, dyspepsia, rhinitis, infection, pharyngitis, asthenia, a rare but serious rash, rare multi-organ failure associated with Reese-Venkatesh Syndrome, toxic epidermal necrolysis, drug hypersensitivity syndrome, rare blood dyscrasias, rare aseptic meningitis, rare sudden unexplained deaths in people with epilepsy, withdrawal seizures upon abrupt withdrawal, and rare activation of suicidal ideation and behavior (suicidality). This APRN has discussed that a very slow dose titration when starting, or changing doses, of Lamictal may reduce the incidence of skin rash and other side effects.  The dosage should not be titrated upwards or increased faster than recommended due to the possibility of the discussed side effects and risk of development of a skin rash (which can become life threatening).    This APRN has also discussed that if the patient stops taking the Lamictal for 3-5 days or longer, it will be necessary to restart the drug with an initial dose titration, as rashes have been reported on reexposure.  If the patient and Provider decide to stop the Lamictal, the patient will follow the directions of this APRN/this office as a guided taper over about two weeks is appropriate due to the risk of relapse in bipolar disorder with those with a mood or bipolar disorder, the risk of seizures in those with epilepsy, and discontinuation symptoms upon rapid discontinuation of Lamictal.    The patient verbalizes understanding of benefits and risks as discussed, the patient/guardian feels the benefits outweigh the risks and is agreeable to continue/take Lamictal as discussed.  The patient is advised should any side effects or rash develops they are to stop the Lamictal immediately and contact this APRN/this office or go to the emergency department immediately.  The patient  verbalizes understanding and agreement with treatment plan in their own words.    Medication and treatment options, both pharmacological and non-pharmacological treatment options, discussed during today's visit, including any off label use of medication. Patient acknowledged and verbally consented with current treatment plan and was educated on the importance of compliance with treatment and follow-up appointments.       MEDICATION ISSUES:    Discussed treatment plan and medication options of prescribed medication as well as the risks, benefits, any black box warnings, and side effects including potential falls, possible impaired driving, and metabolic adversities among others, including any off label use of medication. Patient is agreeable to call the office with any worsening of symptoms or onset of side effects, or if any concerns or questions arise.  The contact information for the office is made available to the patient. They may call the [facility] at [phone]. Patient is agreeable to call 911 or go to the nearest ER should they begin having any SI/HI, or if any urgent concerns arise.        SUICIDE RISK ASSESSMENT: Unalterable demographics and a history of mental health intervention indicate this patient is in a high risk category compared to the general population. At present, the patient denies active SI/HI, intentions, or plans at this time and agrees to seek immediate care should such thoughts develop. The patient verbalizes understanding of how to access emergency care if needed and agrees to do so. Consideration of suicide risk and protective factors such as history, current presentation, individual strengths and weaknesses, psychosocial and environmental stressors and variables, psychiatric illness and symptoms, medical conditions and pain, took place in this interview. Based on those considerations, the patient is determined: within individual baseline and presenting  no imminent risk for suicide or homicide. Other recommendations: The patient does not meet the criteria for inpatient admission and is not a safety risk to self or others at today's visit. Inpatient treatment offers no significant advantages over outpatient treatment for this patient at today's visit.      SAFETY PLAN:  Patient was given ample time for questions and fully participated in treatment planning.  Patient was encouraged to call the clinic with any questions or concerns.  Patient was informed of access to emergency care. If patient were to develop any significant symptomatology, suicidal ideation, homicidal ideation, any concerns, or feel unsafe at any time they are to call the clinic and if unable to get immediate assistance should immediately call 911 or go to the nearest emergency room.  The patient is advised to remove or secure (lock away) all lethal weapons (including guns) and sharps (including razors, scissors, knives, etc.).  All medications (including any prescribed and any over the counter medications) should be stored in a safe and secured location that is not obtainable by children/adolescents.  Patient was given an opportunity and encouraged to ask questions about their medication, illness, and treatment. Patient contracted verbally for the following: If you are experiencing an emotional crisis or have thoughts of harming yourself or others, please go to your nearest local emergency room or call 911. Will continue to re-assess medication response and side effects frequently to establish efficacy and ensure safety. Risks, any black box warnings, side effects, off label usage, and benefits of medication and treatment discussed with patient, along with potential adverse side effects of current and/or newly prescribed medication, alternative treatment options, and OTC medications.  Patient verbalized understanding of potential risks, any off label use of medication, any black box warnings, and  any side effects in their own words. The patient verbalized understanding and agreed to comply with the safety plan discussed in their own words.  Patient given the number to the office. Number also available to the 24- hour suicide hotline.      MEDS ORDERED DURING VISIT:  New Medications Ordered This Visit   Medications    escitalopram (Lexapro) 20 MG tablet     Sig: Take 1.5 tablets PO Daily     Dispense:  45 tablet     Refill:  1       Return in about 4 weeks (around 9/27/2023), or if symptoms worsen or fail to improve, for Recheck.     Treatment plan completed: 3/7/23    Progress toward goal: Not at goal    Functional Status: Moderate impairment     Prognosis: Fair with Ongoing Treatment         This document has been electronically signed by YARY Bush  August 30, 2023 16:46 EDT    Some of the data in this electronic note has been brought forward from a previous encounter, any necessary changes have been made, it has been reviewed by this APRN, and it is accurate.    Please note that portions of this note were completed with a voice recognition program. Efforts were made to edit dictation, but occasionally words are mistranscribed.

## 2023-09-08 DIAGNOSIS — A60.04 HERPES SIMPLEX VULVOVAGINITIS: ICD-10-CM

## 2023-09-11 RX ORDER — VALACYCLOVIR HYDROCHLORIDE 500 MG/1
500 TABLET, FILM COATED ORAL 2 TIMES DAILY
Qty: 12 TABLET | Refills: 3 | Status: SHIPPED | OUTPATIENT
Start: 2023-09-11

## 2023-09-11 NOTE — TELEPHONE ENCOUNTER
Rx Refill Note  Requested Prescriptions     Pending Prescriptions Disp Refills    valACYclovir (Valtrex) 500 MG tablet 12 tablet 3     Sig: Take 1 tablet by mouth 2 (Two) Times a Day. For 3 days per episode      Last office visit with prescribing clinician: 7/7/2023   Last telemedicine visit with prescribing clinician: Visit date not found   Next office visit with prescribing clinician: 10/11/2023                         Would you like a call back once the refill request has been completed: [] Yes [] No    If the office needs to give you a call back, can they leave a voicemail: [] Yes [] No    Kennedy Pimentel MA  09/11/23, 07:39 EDT

## 2023-10-05 ENCOUNTER — OFFICE VISIT (OUTPATIENT)
Age: 63
End: 2023-10-05
Payer: COMMERCIAL

## 2023-10-05 VITALS
BODY MASS INDEX: 32.7 KG/M2 | SYSTOLIC BLOOD PRESSURE: 122 MMHG | WEIGHT: 173.2 LBS | DIASTOLIC BLOOD PRESSURE: 70 MMHG | HEART RATE: 74 BPM | HEIGHT: 61 IN | OXYGEN SATURATION: 98 %

## 2023-10-05 DIAGNOSIS — Z78.0 MENOPAUSE: ICD-10-CM

## 2023-10-05 DIAGNOSIS — M85.88 OTHER SPECIFIED DISORDERS OF BONE DENSITY AND STRUCTURE, OTHER SITE: ICD-10-CM

## 2023-10-05 DIAGNOSIS — E11.9 TYPE 2 DIABETES MELLITUS WITHOUT COMPLICATION, WITHOUT LONG-TERM CURRENT USE OF INSULIN: ICD-10-CM

## 2023-10-05 DIAGNOSIS — Z23 NEED FOR VACCINATION: ICD-10-CM

## 2023-10-05 DIAGNOSIS — K58.0 IRRITABLE BOWEL SYNDROME WITH DIARRHEA: Primary | ICD-10-CM

## 2023-10-05 NOTE — PROGRESS NOTES
"Chief Complaint  Diarrhea and stomach issue    Subjective        Vivian Galvez presents to [facility]  Diarrhea     Hypertension  This is a chronic problem. The problem is controlled. Answers submitted by the patient for this visit:  Primary Reason for Visit (Submitted on 10/5/2023)  What is the primary reason for your visit?: High Blood Pressure    Started having problems a couple years ago and saw GI in [place]. She was treated with glyco something which helped for awhile. Past month acting up again. She tried changing diet. She has urgency. Terrible hemorrhoids that are uncomfortable, not currently bleeding. BM 2-3, up to 4 in a day. Loose stools, sometimes liquidy. No gas, bloating or abdominal pain. She had a couple times of fecal incontinence. No travel or new foods. No recent antibiotics.     She was on lexapro until 2 weeks ago. She takes prozac 20mg currently. Appt with behavioral health today.     Trulicity on Sundays. Metformin once a day.     She went through menopause around age 40-45. She had prior bone density in the past was normal. She has problems with hip joints.       Objective   Vital Signs:  /70   Pulse 74   Ht 154.9 cm (60.98\")   Wt 78.6 kg (173 lb 3.2 oz)   SpO2 98%   BMI 32.74 kg/m²   Estimated body mass index is 32.74 kg/m² as calculated from the following:    Height as of this encounter: 154.9 cm (60.98\").    Weight as of this encounter: 78.6 kg (173 lb 3.2 oz).               Physical Exam  Vitals reviewed.   Constitutional:       General: She is not in acute distress.     Appearance: She is not ill-appearing, toxic-appearing or diaphoretic.   Cardiovascular:      Rate and Rhythm: Normal rate and regular rhythm.   Pulmonary:      Effort: Pulmonary effort is normal.      Breath sounds: Normal breath sounds.   Abdominal:      General: Bowel sounds are normal. There is no distension.      Palpations: Abdomen is soft. There is no hepatomegaly.      " Tenderness: There is no abdominal tenderness.   Neurological:      Mental Status: She is alert.   Psychiatric:         Mood and Affect: Mood normal.      Result Review :                Immunization History   Administered Date(s) Administered    COVID-19 (MODERNA) 1st,2nd,3rd Dose Monovalent 01/28/2021, 02/25/2021, 12/17/2021    COVID-19 (PFIZER) BIVALENT 12+YRS 11/18/2022    Fluzone (or Fluarix & Flulaval for VFC) >6mos 11/01/2021, 11/18/2022, 10/05/2023    Hepatitis A 11/13/2019    Pneumococcal Conjugate 20-Valent (PCV20) 08/12/2022    Tdap 10/05/2023          Assessment and Plan   Diagnoses and all orders for this visit:    1. Irritable bowel syndrome with diarrhea (Primary)  New. Stop metformin for 2 weeks to see if GI symptoms improve.  If no improvement I would recommend treatment with Xifaxan course.  Handouts provided.  Reviewed colonoscopy from 2019 recommending a 5-year repeat which will come due in February 2024.  I do not think stool testing is needed at this time.  2. Need for vaccination  -     Fluzone (or Fluarix & Flulaval for VFC) >6mos  -     Tdap Vaccine Greater Than or Equal To 6yo IM    3. Type 2 diabetes mellitus without complication, without long-term current use of insulin  Continue Trulicity 4.5 mg weekly.  Discontinue metformin due to side effects of diarrhea as above.  If diarrhea improves without metformin then plan to replace metformin with Jardiance.  Discussed benefits and side effects.  Handout provided.  Reassess next month.  4. Menopause  -     DEXA Bone Density Axial; Future    5. Other specified disorders of bone density and structure, other site  -     DEXA Bone Density Axial; Future             Follow Up   Return in about 1 month (around 11/5/2023) for Office visit diabetes, diarrhea.  Patient was given instructions and counseling regarding her condition or for health maintenance advice. Please see specific information pulled into the AVS if appropriate.       Electronically  signed by Peyton Arechiga MD, 10/05/23, 11:47 AM EDT.

## 2023-10-05 NOTE — LETTER
[facility]  Vaccine Consent Form    Patient Name:  Vivian Galvez  Patient :  1960     Vaccine(s) Ordered    Fluzone (or Fluarix & Flulaval for VFC) >6mos  Tdap Vaccine Greater Than or Equal To 6yo IM        Screening Checklist  The following questions should be completed prior to vaccination. If you answer “yes” to any question, it does not necessarily mean you should not be vaccinated. It just means we may need to clarify or ask more questions. If a question is unclear, please ask your healthcare provider to explain it.    Yes No   Any fever or moderate to severe illness today (mild illness and/or antibiotic treatment are not contraindications)?     Do you have a history of a serious reaction to any previous vaccinations, such as anaphylaxis, encephalopathy within 7 days, Guillain-Newman syndrome within 6 weeks, seizure?     Have you received any live vaccine(s) in the past month (MMR, MALCOLM)?     Do you have an anaphylactic allergy to latex (DTaP, DTaP-IPV, Hep A, Hep B, MenB, RV, Td, Tdap), baker’s yeast (Hep B, HPV), or gelatin (MALCOLM, MMR)?     Do you have an anaphylactic allergy to neomycin (Rabies, MALCOLM, MMR, IPV, Hep A), polymyxin B (IPV), or streptomycin (IPV)?      Any cancer, leukemia, AIDS, or other immune system disorder? (MALCOLM, MMR, RV)     Do you have a parent, brother, or sister with an immune system problem (if immune competence of vaccine recipient clinically verified, can proceed)? (MMR, MALCOLM)     Any recent steroid treatments for >2 weeks, chemotherapy, or radiation treatment? (AMLCOLM, MMR)     Have you received antibody-containing blood transfusions or IVIG in the past 11 months (recommended interval is dependent on product)? (MMR, MALCOLM)     Have you taken antiviral drugs (acyclovir, famciclovir, valacyclovir) in the last 24 or 48 hours, respectively (MALCOLM)?      Are you pregnant or planning to become pregnant within 1 month? (MALCOLM, MMR, HPV, IPV, MenB; For hep B- refer to Engerix-B)     For  infants, have you ever been told your child has had intussusception or a medical emergency involving obstruction of the intestine (RV)? If not for an infant, can skip this question.         *Ordering Physician/APC should be consulted if “yes” is checked by the patient or guardian above.      I have received, read, and understand the Vaccine Information Statement (VIS) for each vaccine ordered above.  I have considered my health status as well as the health status of my close contacts.  I have taken the opportunity to discuss my vaccine questions with my health care provider.   I have requested that the ordered vaccine(s) be given to me.  I understand the benefits and risks of the vaccines.  I understand that I should remain in the clinic for 15 minutes after receiving the vaccine(s).  _________________________________________________________  Signature of Patient or Parent/Legal Guardian ____________________  Date

## 2023-10-23 DIAGNOSIS — A60.04 HERPES SIMPLEX VULVOVAGINITIS: ICD-10-CM

## 2023-10-23 RX ORDER — CYANOCOBALAMIN 1000 UG/ML
1000 INJECTION, SOLUTION INTRAMUSCULAR; SUBCUTANEOUS
Qty: 10 ML | Refills: 1 | Status: SHIPPED | OUTPATIENT
Start: 2023-10-23

## 2023-10-23 RX ORDER — VALACYCLOVIR HYDROCHLORIDE 500 MG/1
500 TABLET, FILM COATED ORAL 2 TIMES DAILY
Qty: 12 TABLET | Refills: 3 | Status: SHIPPED | OUTPATIENT
Start: 2023-10-23

## 2023-10-23 NOTE — TELEPHONE ENCOUNTER
Rx Refill Note  Requested Prescriptions     Pending Prescriptions Disp Refills    cyanocobalamin 1000 MCG/ML injection 10 mL 1     Sig: Inject 1 mL under the skin into the appropriate area as directed Every 30 (Thirty) Days.    valACYclovir (Valtrex) 500 MG tablet 12 tablet 3     Sig: Take 1 tablet by mouth 2 (Two) Times a Day. For 3 days per episode      Last office visit with prescribing clinician: 10/5/2023   Last telemedicine visit with prescribing clinician: Visit date not found   Next office visit with prescribing clinician: 11/6/2023                         Would you like a call back once the refill request has been completed: [] Yes [] No    If the office needs to give you a call back, can they leave a voicemail: [] Yes [] No    Licha Burns MA  10/23/23, 09:45 EDT

## 2023-11-06 ENCOUNTER — OFFICE VISIT (OUTPATIENT)
Age: 63
End: 2023-11-06
Payer: COMMERCIAL

## 2023-11-06 VITALS
OXYGEN SATURATION: 97 % | HEART RATE: 70 BPM | HEIGHT: 61 IN | SYSTOLIC BLOOD PRESSURE: 130 MMHG | BODY MASS INDEX: 33.34 KG/M2 | DIASTOLIC BLOOD PRESSURE: 70 MMHG | WEIGHT: 176.6 LBS

## 2023-11-06 DIAGNOSIS — E11.9 TYPE 2 DIABETES MELLITUS WITHOUT COMPLICATION, WITHOUT LONG-TERM CURRENT USE OF INSULIN: ICD-10-CM

## 2023-11-06 DIAGNOSIS — E11.9 TYPE 2 DIABETES MELLITUS WITHOUT COMPLICATION, WITHOUT LONG-TERM CURRENT USE OF INSULIN: Primary | ICD-10-CM

## 2023-11-06 LAB
EXPIRATION DATE: NORMAL
HBA1C MFR BLD: 5.7 % (ref 4.5–5.7)
Lab: NORMAL

## 2023-11-06 PROCEDURE — 83036 HEMOGLOBIN GLYCOSYLATED A1C: CPT | Performed by: FAMILY MEDICINE

## 2023-11-06 PROCEDURE — 82043 UR ALBUMIN QUANTITATIVE: CPT | Performed by: FAMILY MEDICINE

## 2023-11-06 PROCEDURE — 99213 OFFICE O/P EST LOW 20 MIN: CPT | Performed by: FAMILY MEDICINE

## 2023-11-06 RX ORDER — GLUCOSAMINE HCL/CHONDROITIN SU 500-400 MG
1 CAPSULE ORAL DAILY
Qty: 50 EACH | Refills: 11 | Status: SHIPPED | OUTPATIENT
Start: 2023-11-06

## 2023-11-06 RX ORDER — LANCETS 28 GAUGE
EACH MISCELLANEOUS
Qty: 50 EACH | Refills: 11 | Status: SHIPPED | OUTPATIENT
Start: 2023-11-06

## 2023-11-06 RX ORDER — BLOOD-GLUCOSE METER
1 KIT MISCELLANEOUS ONCE
Qty: 1 EACH | Refills: 0 | Status: SHIPPED | OUTPATIENT
Start: 2023-11-06 | End: 2023-11-06

## 2023-11-06 NOTE — PATIENT INSTRUCTIONS
Check fingerstick glucose in the morning before eating, goal <130.   Check fingerstick 2 hours after a meal, goal <180.

## 2023-11-06 NOTE — PROGRESS NOTES
"Chief Complaint  Diabetes and Diarrhea    Subjective        Vivian Galvez presents to [facility]  Diabetes  She presents for her follow-up diabetic visit. She has type 2 diabetes mellitus. Current diabetic treatment includes oral agent (monotherapy) (GLP-1a injection).   Diarrhea     She went off metformin about a 4-6 weeks, which has helped. One Bm per day and formed. Sometimes she goes more than once. She uses Trulicity on Sundays. She hasn't checked home glucose for some time.  Trying to walk more and watching  carbs and sweets.         Objective   Vital Signs:  /70   Pulse 70   Ht 154.9 cm (60.98\")   Wt 80.1 kg (176 lb 9.6 oz)   SpO2 97%   BMI 33.39 kg/m²   Estimated body mass index is 33.39 kg/m² as calculated from the following:    Height as of this encounter: 154.9 cm (60.98\").    Weight as of this encounter: 80.1 kg (176 lb 9.6 oz).         BMI is >= 30 and <35. (Class 1 Obesity). The following options were offered after discussion;: nutrition counseling/recommendations        Physical Exam  Vitals reviewed.   Constitutional:       General: She is not in acute distress.     Appearance: She is not ill-appearing.   Cardiovascular:      Rate and Rhythm: Normal rate and regular rhythm.   Pulmonary:      Effort: Pulmonary effort is normal.      Breath sounds: Normal breath sounds.   Neurological:      Mental Status: She is alert.        Result Review :  The following data was reviewed by: Peyton Arechiga MD on 11/06/2023:  Common labs          3/14/2023    11:45 7/7/2023    13:35 11/6/2023    10:40   Common Labs   Hemoglobin A1C 6.4  6.2  5.7      A1C Last 3 Results          3/14/2023    11:45 7/7/2023    13:35 11/6/2023    10:40   HGBA1C Last 3 Results   Hemoglobin A1C 6.4  6.2  5.7                   Assessment and Plan   Diagnoses and all orders for this visit:    1. Type 2 diabetes mellitus without complication, without long-term current use of insulin " (Primary)  -     POC Glycosylated Hemoglobin (Hb A1C)  -     Cancel: POC Microalbumin  -     glucose monitor monitoring kit; Use 1 each 1 (One) Time for 1 dose. Use as directed  Dispense: 1 each; Refill: 0  -     Glucose Blood (Blood Glucose Test) strip; Use 1 each Daily.  Dispense: 50 each; Refill: 11  -     Lancets (freestyle) lancets; Once a day testing  Dispense: 50 each; Refill: 11  -     MicroAlbumin, Urine, Random - Urine, Clean Catch; Future    Diarrhea resolved off metformin. A1c improved with lifestyle changes. Continue Trulicity 4.5 mg weekly.   Check fingerstick glucose in the morning before eating, goal <130.   Check fingerstick 2 hours after a meal, goal <180.   Recheck in 3 months.        Follow Up   Return in about 3 months (around 2/7/2024) for Physical and fasting labs, diabetes.  Patient was given instructions and counseling regarding her condition or for health maintenance advice. Please see specific information pulled into the AVS if appropriate.     Electronically signed by Peyton Arechiga MD, 11/06/23, 10:47 AM EST.

## 2023-11-07 LAB — ALBUMIN UR-MCNC: <1.2 MG/DL

## 2023-12-05 DIAGNOSIS — F41.1 GENERALIZED ANXIETY DISORDER: Chronic | ICD-10-CM

## 2023-12-05 DIAGNOSIS — F33.1 MAJOR DEPRESSIVE DISORDER, RECURRENT EPISODE, MODERATE: Chronic | ICD-10-CM

## 2023-12-05 NOTE — TELEPHONE ENCOUNTER
Patient has requested medication.  has attempted to contact patient in regards to an appointment.  Patient did not answer.  Sent a my chart message.

## 2023-12-06 ENCOUNTER — PRIOR AUTHORIZATION (OUTPATIENT)
Age: 63
End: 2023-12-06
Payer: COMMERCIAL

## 2023-12-06 RX ORDER — LAMOTRIGINE 200 MG/1
200 TABLET ORAL DAILY
Qty: 30 TABLET | Refills: 0 | Status: SHIPPED | OUTPATIENT
Start: 2023-12-06

## 2023-12-06 NOTE — TELEPHONE ENCOUNTER
Trulicity 4.5MG/0.5ML pen-injectors were approved by insurance plan. Approval letter in chart.    12/6/23

## 2023-12-06 NOTE — TELEPHONE ENCOUNTER
Pt has not been seen since 8/30/23. I will send in a 30 day supply, but she needs to be seen within the next 4 weeks for further refills. Please let her know. Thank you.

## 2023-12-06 NOTE — TELEPHONE ENCOUNTER
Key: [id number]    Drug:   Trulicity 4.5MG/0.5ML pen-injectors    Sent to Plan-Awaiting response  12/6/23

## 2024-01-02 RX ORDER — GALCANEZUMAB 120 MG/ML
INJECTION, SOLUTION SUBCUTANEOUS
Qty: 1 ML | Refills: 1 | Status: SHIPPED | OUTPATIENT
Start: 2024-01-02

## 2024-02-07 ENCOUNTER — LAB (OUTPATIENT)
Age: 64
End: 2024-02-07
Payer: COMMERCIAL

## 2024-02-07 ENCOUNTER — OFFICE VISIT (OUTPATIENT)
Age: 64
End: 2024-02-07
Payer: COMMERCIAL

## 2024-02-07 VITALS
OXYGEN SATURATION: 95 % | HEART RATE: 79 BPM | BODY MASS INDEX: 34.34 KG/M2 | HEIGHT: 61 IN | WEIGHT: 181.9 LBS | SYSTOLIC BLOOD PRESSURE: 114 MMHG | DIASTOLIC BLOOD PRESSURE: 70 MMHG

## 2024-02-07 DIAGNOSIS — Z12.11 SCREENING FOR MALIGNANT NEOPLASM OF COLON: ICD-10-CM

## 2024-02-07 DIAGNOSIS — E78.5 DYSLIPIDEMIA: ICD-10-CM

## 2024-02-07 DIAGNOSIS — Z00.00 ROUTINE GENERAL MEDICAL EXAMINATION AT A HEALTH CARE FACILITY: ICD-10-CM

## 2024-02-07 DIAGNOSIS — M85.88 OTHER SPECIFIED DISORDERS OF BONE DENSITY AND STRUCTURE, OTHER SITE: ICD-10-CM

## 2024-02-07 DIAGNOSIS — Z00.00 WELL ADULT EXAM: Primary | ICD-10-CM

## 2024-02-07 DIAGNOSIS — E53.8 BIOTIN-(PROPIONYL-COA-CARBOXYLASE) LIGASE DEFICIENCY: ICD-10-CM

## 2024-02-07 DIAGNOSIS — E13.69 OTHER SPECIFIED DIABETES MELLITUS WITH OTHER SPECIFIED COMPLICATION, UNSPECIFIED WHETHER LONG TERM INSULIN USE: ICD-10-CM

## 2024-02-07 DIAGNOSIS — I10 ESSENTIAL HYPERTENSION, MALIGNANT: ICD-10-CM

## 2024-02-07 DIAGNOSIS — E11.9 TYPE 2 DIABETES MELLITUS WITHOUT COMPLICATION, WITHOUT LONG-TERM CURRENT USE OF INSULIN: ICD-10-CM

## 2024-02-07 DIAGNOSIS — E78.5 HYPERLIPIDEMIA, UNSPECIFIED HYPERLIPIDEMIA TYPE: Primary | ICD-10-CM

## 2024-02-07 DIAGNOSIS — Z12.31 VISIT FOR SCREENING MAMMOGRAM: ICD-10-CM

## 2024-02-07 DIAGNOSIS — Z00.00 WELL ADULT EXAM: ICD-10-CM

## 2024-02-07 DIAGNOSIS — E55.9 VITAMIN D DEFICIENCY: ICD-10-CM

## 2024-02-07 DIAGNOSIS — Z78.0 MENOPAUSE: ICD-10-CM

## 2024-02-07 DIAGNOSIS — E53.8 B12 DEFICIENCY: ICD-10-CM

## 2024-02-07 DIAGNOSIS — E55.9 AVITAMINOSIS D: ICD-10-CM

## 2024-02-07 DIAGNOSIS — E66.09 CLASS 1 OBESITY DUE TO EXCESS CALORIES WITH SERIOUS COMORBIDITY AND BODY MASS INDEX (BMI) OF 34.0 TO 34.9 IN ADULT: ICD-10-CM

## 2024-02-07 DIAGNOSIS — I10 PRIMARY HYPERTENSION: ICD-10-CM

## 2024-02-07 LAB
25(OH)D3 SERPL-MCNC: 22.2 NG/ML (ref 30–100)
ALBUMIN SERPL-MCNC: 4.1 G/DL (ref 3.5–5.2)
ALBUMIN/GLOB SERPL: 1.4 G/DL
ALP SERPL-CCNC: 52 U/L (ref 39–117)
ALT SERPL W P-5'-P-CCNC: 13 U/L (ref 1–33)
ANION GAP SERPL CALCULATED.3IONS-SCNC: 11.9 MMOL/L (ref 5–15)
AST SERPL-CCNC: 14 U/L (ref 1–32)
BILIRUB SERPL-MCNC: 0.4 MG/DL (ref 0–1.2)
BUN SERPL-MCNC: 16 MG/DL (ref 8–23)
BUN/CREAT SERPL: 17 (ref 7–25)
CALCIUM SPEC-SCNC: 9.6 MG/DL (ref 8.6–10.5)
CHLORIDE SERPL-SCNC: 99 MMOL/L (ref 98–107)
CHOLEST SERPL-MCNC: 267 MG/DL (ref 0–200)
CO2 SERPL-SCNC: 27.1 MMOL/L (ref 22–29)
CREAT SERPL-MCNC: 0.94 MG/DL (ref 0.57–1)
DEPRECATED RDW RBC AUTO: 36.8 FL (ref 37–54)
EGFRCR SERPLBLD CKD-EPI 2021: 68.3 ML/MIN/1.73
ERYTHROCYTE [DISTWIDTH] IN BLOOD BY AUTOMATED COUNT: 15.2 % (ref 12.3–15.4)
EXPIRATION DATE: ABNORMAL
GLOBULIN UR ELPH-MCNC: 2.9 GM/DL
GLUCOSE SERPL-MCNC: 127 MG/DL (ref 65–99)
HBA1C MFR BLD: 6.2 % (ref 4.5–5.7)
HCT VFR BLD AUTO: 36.3 % (ref 34–46.6)
HDLC SERPL-MCNC: 69 MG/DL (ref 40–60)
HGB BLD-MCNC: 10.9 G/DL (ref 12–15.9)
LDLC SERPL CALC-MCNC: 158 MG/DL (ref 0–100)
LDLC/HDLC SERPL: 2.23 {RATIO}
Lab: ABNORMAL
MCH RBC QN AUTO: 20.7 PG (ref 26.6–33)
MCHC RBC AUTO-ENTMCNC: 30 G/DL (ref 31.5–35.7)
MCV RBC AUTO: 69 FL (ref 79–97)
PLATELET # BLD AUTO: 478 10*3/MM3 (ref 140–450)
PMV BLD AUTO: 9.7 FL (ref 6–12)
POTASSIUM SERPL-SCNC: 4.1 MMOL/L (ref 3.5–5.2)
PROT SERPL-MCNC: 7 G/DL (ref 6–8.5)
RBC # BLD AUTO: 5.26 10*6/MM3 (ref 3.77–5.28)
SODIUM SERPL-SCNC: 138 MMOL/L (ref 136–145)
TRIGL SERPL-MCNC: 222 MG/DL (ref 0–150)
TSH SERPL DL<=0.05 MIU/L-ACNC: 0.93 UIU/ML (ref 0.27–4.2)
VIT B12 BLD-MCNC: 576 PG/ML (ref 211–946)
VLDLC SERPL-MCNC: 40 MG/DL (ref 5–40)
WBC NRBC COR # BLD AUTO: 6.64 10*3/MM3 (ref 3.4–10.8)

## 2024-02-07 PROCEDURE — 82607 VITAMIN B-12: CPT | Performed by: FAMILY MEDICINE

## 2024-02-07 PROCEDURE — 82306 VITAMIN D 25 HYDROXY: CPT | Performed by: FAMILY MEDICINE

## 2024-02-07 PROCEDURE — 80061 LIPID PANEL: CPT | Performed by: FAMILY MEDICINE

## 2024-02-07 PROCEDURE — 80050 GENERAL HEALTH PANEL: CPT | Performed by: FAMILY MEDICINE

## 2024-02-07 RX ORDER — DULAGLUTIDE 4.5 MG/.5ML
4.5 INJECTION, SOLUTION SUBCUTANEOUS WEEKLY
Qty: 6 ML | Refills: 1 | Status: SHIPPED | OUTPATIENT
Start: 2024-02-07

## 2024-02-07 RX ORDER — LISINOPRIL 40 MG/1
40 TABLET ORAL DAILY
Qty: 90 TABLET | Refills: 1 | Status: SHIPPED | OUTPATIENT
Start: 2024-02-07

## 2024-02-07 RX ORDER — HYDROCHLOROTHIAZIDE 25 MG/1
25 TABLET ORAL DAILY
Qty: 90 TABLET | Refills: 1 | Status: SHIPPED | OUTPATIENT
Start: 2024-02-07

## 2024-02-07 RX ORDER — VILAZODONE HYDROCHLORIDE 20 MG/1
20 TABLET ORAL DAILY
COMMUNITY
Start: 2024-01-25

## 2024-02-07 RX ORDER — ATORVASTATIN CALCIUM 20 MG/1
20 TABLET, FILM COATED ORAL NIGHTLY
Qty: 90 TABLET | Refills: 3 | Status: SHIPPED | OUTPATIENT
Start: 2024-02-07

## 2024-02-07 NOTE — PROGRESS NOTES
"Chief Complaint  Well adult exam and Annual Exam    Subjective          Vivian Galvez presents to [facility] for   History of Present Illness      Psychiatrist increased Viibryd  yesterday.     She had cortisone shot in hip for bursitis and tendonitis. Pain is gone.     She has been sitting inside more. Not eating like she should.     Her daughter gives B12 shots at home.             2/7/2024     9:03 AM   PHQ-2/PHQ-9 Depression Screening   Little Interest or Pleasure in Doing Things 0-->not at all   Feeling Down, Depressed or Hopeless 1-->several days   PHQ-9: Brief Depression Severity Measure Score 1       Objective   Vital Signs:   Vitals:    02/07/24 0859   BP: 114/70   Pulse: 79   SpO2: 95%   Weight: 82.5 kg (181 lb 14.4 oz)   Height: 154.9 cm (60.98\")   PainSc: 0-No pain     Body mass index is 34.39 kg/m².                Physical Exam  Constitutional:       General: She is not in acute distress.     Appearance: She is obese.   HENT:      Right Ear: Tympanic membrane and ear canal normal.      Left Ear: Tympanic membrane and ear canal normal.      Nose: No congestion or rhinorrhea.      Mouth/Throat:      Mouth: Mucous membranes are moist.      Pharynx: No oropharyngeal exudate or posterior oropharyngeal erythema.   Eyes:      General:         Right eye: No discharge.         Left eye: No discharge.      Conjunctiva/sclera: Conjunctivae normal.   Neck:      Thyroid: No thyromegaly.   Cardiovascular:      Rate and Rhythm: Normal rate and regular rhythm.      Pulses:           Posterior tibial pulses are 2+ on the right side and 2+ on the left side.   Pulmonary:      Effort: Pulmonary effort is normal.      Breath sounds: Normal breath sounds.   Abdominal:      Palpations: Abdomen is soft. There is no hepatomegaly.      Tenderness: There is no abdominal tenderness.   Musculoskeletal:      Cervical back: Neck supple.   Feet:      Right foot:      Protective Sensation: 6 sites tested. "  6 sites sensed.      Skin integrity: Dry skin present. No ulcer, blister or skin breakdown.      Toenail Condition: Right toenails are normal.      Left foot:      Protective Sensation: 6 sites tested.  6 sites sensed.      Skin integrity: Dry skin present. No ulcer, blister or skin breakdown.      Toenail Condition: Left toenails are normal.      Comments: Diabetic Foot Exam Performed and Monofilament Test Performed            Lymphadenopathy:      Head:      Right side of head: No submandibular, preauricular or posterior auricular adenopathy.      Left side of head: No submandibular, preauricular or posterior auricular adenopathy.      Cervical: No cervical adenopathy.   Skin:     General: Skin is warm.   Neurological:      Mental Status: She is alert and oriented to person, place, and time.   Psychiatric:         Mood and Affect: Mood normal.         Behavior: Behavior normal.         Thought Content: Thought content normal.         Judgment: Judgment normal.        Result Review :   The following data was reviewed by: Peyton Arechiga MD on 02/07/2024:  Common labs          7/7/2023    13:35 11/6/2023    10:40 11/6/2023    11:56 2/7/2024    09:09   Common Labs   Hemoglobin A1C 6.2  5.7   6.2    Microalbumin, Urine   <1.2       A1C Last 3 Results          7/7/2023    13:35 11/6/2023    10:40 2/7/2024    09:09   HGBA1C Last 3 Results   Hemoglobin A1C 6.2  5.7  6.2               Immunization History   Administered Date(s) Administered    COVID-19 (MODERNA) 1st,2nd,3rd Dose Monovalent 01/28/2021, 02/25/2021, 12/17/2021    COVID-19 (PFIZER) BIVALENT 12+YRS 11/18/2022    Fluzone (or Fluarix & Flulaval for VFC) >6mos 11/01/2021, 11/18/2022, 10/05/2023    Hepatitis A 11/13/2019    Pneumococcal Conjugate 20-Valent (PCV20) 08/12/2022    Tdap 10/05/2023       Health Maintenance   Topic Date Due    MAMMOGRAM  Never done    ZOSTER VACCINE (1 of 2) Never done    RSV Vaccine - Adults (1 - 1-dose 60+ series) Never done     HEPATITIS C SCREENING  Never done    DIABETIC FOOT EXAM  Never done    DIABETIC EYE EXAM  07/11/2023    COVID-19 Vaccine (5 - 2023-24 season) 09/01/2023    ANNUAL PHYSICAL  01/18/2024    COLORECTAL CANCER SCREENING  02/13/2024    HEMOGLOBIN A1C  05/07/2024    URINE MICROALBUMIN  11/06/2024    BMI FOLLOWUP  11/06/2024    PAP SMEAR  01/18/2027    TDAP/TD VACCINES (2 - Td or Tdap) 10/05/2033    Pneumococcal Vaccine 0-64  Completed    INFLUENZA VACCINE  Completed            Assessment and Plan    Diagnoses and all orders for this visit:    1. Well adult exam (Primary)  -     CBC (No Diff); Future  -     Comprehensive Metabolic Panel; Future  -     Lipid Panel; Future  -     TSH Rfx On Abnormal To Free T4; Future  Check labs today.  She has had toast with coffee this morning.  Schedule breast cancer screening mammogram at local hospitals.  Schedule bone density screening for osteoporosis at [facility].  She is due for her 5-year follow-up colonoscopy due to history of colon polyps to schedule at local hospital.  Recommend Shingrix vaccine at local pharmacy.  Also consider RSV vaccine.  Recommend COVID-19 booster.  Cervical cancer screening Pap smear up-to-date.  She deferred pelvic exam today.  2. Type 2 diabetes mellitus without complication, without long-term current use of insulin  -     Comprehensive Metabolic Panel; Future  -     Lipid Panel; Future  -     POC Glycosylated Hemoglobin (Hb A1C)  -     Dulaglutide (Trulicity) 4.5 MG/0.5ML solution pen-injector; Inject 0.5 mL under the skin into the appropriate area as directed 1 (One) Time Per Week.  Dispense: 6 mL; Refill: 1  Stable.  At goal less than 6.5.  Continue Trulicity.  Recheck in 6 months.  3. Primary hypertension  -     Comprehensive Metabolic Panel; Future  -     Lipid Panel; Future  -     hydroCHLOROthiazide (HYDRODIURIL) 25 MG tablet; Take 1 tablet by mouth Daily.  Dispense: 90 tablet; Refill: 1  -     lisinopril (PRINIVIL,ZESTRIL) 40 MG tablet; Take  1 tablet by mouth Daily.  Dispense: 90 tablet; Refill: 1  Stable.  4. Dyslipidemia  -     Comprehensive Metabolic Panel; Future  -     Lipid Panel; Future  -     atorvastatin (LIPITOR) 20 MG tablet; Take 1 tablet by mouth Every Night.  Dispense: 90 tablet; Refill: 3  LDL goal less than 100.  5. Class 1 obesity due to excess calories with serious comorbidity and body mass index (BMI) of 34.0 to 34.9 in adult  BMI is >= 30 and <35. (Class 1 Obesity). The following options were offered after discussion;: exercise counseling/recommendations    6. Vitamin D deficiency  -     Vitamin D,25-Hydroxy; Future    7. Screening for malignant neoplasm of colon  -     Ambulatory Referral to General Surgery    8. Visit for screening mammogram  -     Mammo Screening Digital Tomosynthesis Bilateral With CAD; Future    9. Other specified disorders of bone density and structure, other site  -     DEXA Bone Density Axial; Future    10. Menopause  -     DEXA Bone Density Axial; Future    11. B12 deficiency  -     Vitamin B12; Future  Currently receiving injections at home.      Counseling/anticipatory guidance: Nutrition, physical activity, dental health, mental health, eye exam, immunizations, screenings      Follow Up   Return in about 6 months (around 8/7/2024) for Office visit diabetes, HTN AND , Physical and fasting labs 1 year.  Patient was given instructions and counseling regarding her condition or for health maintenance advice. Please see specific information pulled into the AVS if appropriate.     Electronically signed by Peyton Arechiga MD, 02/07/24, 9:38 AM EST.

## 2024-04-23 DIAGNOSIS — E11.9 TYPE 2 DIABETES MELLITUS WITHOUT COMPLICATION, WITHOUT LONG-TERM CURRENT USE OF INSULIN: ICD-10-CM

## 2024-04-23 RX ORDER — DULAGLUTIDE 4.5 MG/.5ML
4.5 INJECTION, SOLUTION SUBCUTANEOUS WEEKLY
Qty: 6 ML | Refills: 1 | Status: SHIPPED | OUTPATIENT
Start: 2024-04-23

## 2024-04-23 NOTE — TELEPHONE ENCOUNTER
Rx Refill Note  Requested Prescriptions     Pending Prescriptions Disp Refills    Dulaglutide (Trulicity) 4.5 MG/0.5ML solution pen-injector 6 mL 1     Sig: Inject 0.5 mL under the skin into the appropriate area as directed 1 (One) Time Per Week.      Last office visit with prescribing clinician: 2/7/2024   Last telemedicine visit with prescribing clinician: Visit date not found   Next office visit with prescribing clinician: 8/12/2024                         Would you like a call back once the refill request has been completed: [] Yes [] No    If the office needs to give you a call back, can they leave a voicemail: [] Yes [] No    Zakiya Menendez MA  04/23/24, 10:18 EDT

## 2024-05-07 ENCOUNTER — PATIENT MESSAGE (OUTPATIENT)
Age: 64
End: 2024-05-07
Payer: COMMERCIAL

## 2024-05-10 DIAGNOSIS — E11.9 TYPE 2 DIABETES MELLITUS WITHOUT COMPLICATION, WITHOUT LONG-TERM CURRENT USE OF INSULIN: ICD-10-CM

## 2024-05-10 RX ORDER — DULAGLUTIDE 4.5 MG/.5ML
4.5 INJECTION, SOLUTION SUBCUTANEOUS WEEKLY
Qty: 6 ML | Refills: 1 | Status: CANCELLED | OUTPATIENT
Start: 2024-05-10

## 2024-05-10 NOTE — TELEPHONE ENCOUNTER
Rx Refill Note  Requested Prescriptions     Pending Prescriptions Disp Refills    Dulaglutide (Trulicity) 4.5 MG/0.5ML solution pen-injector 6 mL 1     Sig: Inject 0.5 mL under the skin into the appropriate area as directed 1 (One) Time Per Week.      Last office visit with prescribing clinician: 2/7/2024   Last telemedicine visit with prescribing clinician: Visit date not found   Next office visit with prescribing clinician: 8/12/2024                         Would you like a call back once the refill request has been completed: [] Yes [] No    If the office needs to give you a call back, can they leave a voicemail: [] Yes [] No    Zakiya Menendez MA  05/10/24, 13:49 EDT    RELAY  trulicity was sent to pharmacy 4/23/24 with 1 refill, please contact pharmacy to fill medication

## 2024-05-10 NOTE — TELEPHONE ENCOUNTER
Caller: Vivian Galvez    Relationship: Self    Best call back number: [phone]     Requested Prescriptions:   Requested Prescriptions     Pending Prescriptions Disp Refills    Dulaglutide (Trulicity) 4.5 MG/0.5ML solution pen-injector 6 mL 1     Sig: Inject 0.5 mL under the skin into the appropriate area as directed 1 (One) Time Per Week.        Pharmacy where request should be sent: [facility] [id number] [facility] [address] - [phone] [facility] [phone] FX     Last office visit with prescribing clinician: 2/7/2024   Last telemedicine visit with prescribing clinician: Visit date not found   Next office visit with prescribing clinician: 8/12/2024     Additional details provided by patient:NO MEDICATION ON HAND . PATIENT HAS BEEN OUT FOR OVER 2 DAYS     Does the patient have less than a 3 day supply:  [x] Yes  [] No    Would you like a call back once the refill request has been completed: [x] Yes [] No    If the office needs to give you a call back, can they leave a voicemail: [] Yes [] No    Kendra Lisa Rep   05/10/24 12:37 EDT

## 2024-05-13 ENCOUNTER — PATIENT MESSAGE (OUTPATIENT)
Age: 64
End: 2024-05-13
Payer: COMMERCIAL

## 2024-05-13 ENCOUNTER — TELEPHONE (OUTPATIENT)
Age: 64
End: 2024-05-13
Payer: COMMERCIAL

## 2024-05-13 DIAGNOSIS — E11.9 TYPE 2 DIABETES MELLITUS WITHOUT COMPLICATION, WITHOUT LONG-TERM CURRENT USE OF INSULIN: Primary | ICD-10-CM

## 2024-05-13 RX ORDER — DULAGLUTIDE 1.5 MG/.5ML
1.5 INJECTION, SOLUTION SUBCUTANEOUS WEEKLY
Qty: 2 ML | Refills: 1 | Status: SHIPPED | OUTPATIENT
Start: 2024-05-13

## 2024-05-13 NOTE — TELEPHONE ENCOUNTER
Caller: Vivian Galvez    Relationship: Self    Best call back number: [phone]     Which medication are you concerned about: TRULICITY 4.5    Who prescribed you this medication: ANDREW        What are your concerns: PATIENT CAN NOT FIND THE ABOVE MEDICATION IN STOCK. PLEASE CALL PATIENT TODAY TO ADVISE WHAT NEXT STEPS . IF PCP WANTS TO TRY ANOTHER MEDICATION SHE CAN GET OR SEE IF THE OFFICE HAS ANY SAMPLES. SHE HAS MISSED 3 DOSES. CALL PATIENT TODAY

## 2024-05-14 RX ORDER — DULAGLUTIDE 1.5 MG/.5ML
1.5 INJECTION, SOLUTION SUBCUTANEOUS WEEKLY
Qty: 2 ML | Refills: 5 | Status: SHIPPED | OUTPATIENT
Start: 2024-05-14

## 2024-07-28 DIAGNOSIS — E11.9 TYPE 2 DIABETES MELLITUS WITHOUT COMPLICATION, WITHOUT LONG-TERM CURRENT USE OF INSULIN: ICD-10-CM

## 2024-07-29 RX ORDER — DULAGLUTIDE 4.5 MG/.5ML
4.5 INJECTION, SOLUTION SUBCUTANEOUS WEEKLY
Qty: 6 ML | Refills: 1 | Status: SHIPPED | OUTPATIENT
Start: 2024-07-29

## 2024-07-29 NOTE — TELEPHONE ENCOUNTER
Rx Refill Note  Requested Prescriptions     Pending Prescriptions Disp Refills    Dulaglutide (Trulicity) 4.5 MG/0.5ML solution pen-injector 6 mL 1     Sig: Inject 0.5 mL under the skin into the appropriate area as directed 1 (One) Time Per Week.      Last office visit with prescribing clinician: 2/7/2024   Last telemedicine visit with prescribing clinician: Visit date not found   Next office visit with prescribing clinician: 8/12/2024     Kendra Whipple Rep  07/29/24, 12:40 EDT    There are a few different doses on this not sure if she is to take all of them or not.

## 2024-08-07 DIAGNOSIS — I10 PRIMARY HYPERTENSION: ICD-10-CM

## 2024-08-07 LAB
NCCN CRITERIA FLAG: NORMAL
TYRER CUZICK SCORE: 4.9

## 2024-08-07 RX ORDER — LISINOPRIL 40 MG/1
40 TABLET ORAL DAILY
Qty: 90 TABLET | Refills: 0 | Status: SHIPPED | OUTPATIENT
Start: 2024-08-07

## 2024-08-07 RX ORDER — HYDROCHLOROTHIAZIDE 25 MG/1
25 TABLET ORAL DAILY
Qty: 90 TABLET | Refills: 0 | Status: SHIPPED | OUTPATIENT
Start: 2024-08-07

## 2024-08-07 NOTE — TELEPHONE ENCOUNTER
Rx Refill Note  Requested Prescriptions     Pending Prescriptions Disp Refills    lisinopril (PRINIVIL,ZESTRIL) 40 MG tablet [Pharmacy Med Name: LISINOPRIL 40 MG TABLET] 90 tablet 1     Sig: TAKE 1 TABLET BY MOUTH DAILY    hydroCHLOROthiazide 25 MG tablet [Pharmacy Med Name: hydroCHLOROthiazide 25 MG TABLET] 90 tablet 1     Sig: TAKE 1 TABLET BY MOUTH DAILY      Last office visit with prescribing clinician: 2/7/2024   Last telemedicine visit with prescribing clinician: Visit date not found   Next office visit with prescribing clinician: 8/12/2024                         Would you like a call back once the refill request has been completed: [] Yes [] No    If the office needs to give you a call back, can they leave a voicemail: [] Yes [] No    Tra Caballero MA  08/07/24, 13:16 EDT    PT HAS APPOINTMENT 08/12/2024

## 2024-08-12 ENCOUNTER — OFFICE VISIT (OUTPATIENT)
Age: 64
End: 2024-08-12
Payer: COMMERCIAL

## 2024-08-12 VITALS
DIASTOLIC BLOOD PRESSURE: 66 MMHG | HEART RATE: 78 BPM | OXYGEN SATURATION: 98 % | HEIGHT: 61 IN | WEIGHT: 180.5 LBS | BODY MASS INDEX: 34.08 KG/M2 | SYSTOLIC BLOOD PRESSURE: 114 MMHG

## 2024-08-12 DIAGNOSIS — E11.65 TYPE 2 DIABETES MELLITUS WITH HYPERGLYCEMIA, WITHOUT LONG-TERM CURRENT USE OF INSULIN: Primary | ICD-10-CM

## 2024-08-12 DIAGNOSIS — I10 PRIMARY HYPERTENSION: ICD-10-CM

## 2024-08-12 PROBLEM — G43.909 MIGRAINE: Status: ACTIVE | Noted: 2024-08-12

## 2024-08-12 PROBLEM — K21.9 GERD (GASTROESOPHAGEAL REFLUX DISEASE): Status: ACTIVE | Noted: 2024-08-12

## 2024-08-12 PROBLEM — B00.9 HERPES SIMPLEX VIRAL INFECTION: Status: ACTIVE | Noted: 2024-08-12

## 2024-08-12 LAB
EXPIRATION DATE: ABNORMAL
HBA1C MFR BLD: 6.7 % (ref 4.5–5.7)
Lab: ABNORMAL

## 2024-08-12 PROCEDURE — 83036 HEMOGLOBIN GLYCOSYLATED A1C: CPT | Performed by: FAMILY MEDICINE

## 2024-08-12 PROCEDURE — 99214 OFFICE O/P EST MOD 30 MIN: CPT | Performed by: FAMILY MEDICINE

## 2024-08-12 PROCEDURE — 82043 UR ALBUMIN QUANTITATIVE: CPT | Performed by: FAMILY MEDICINE

## 2024-08-12 RX ORDER — VORTIOXETINE 20 MG/1
TABLET, FILM COATED ORAL
COMMUNITY
Start: 2024-07-10

## 2024-08-12 RX ORDER — ATOMOXETINE 25 MG/1
25 CAPSULE ORAL DAILY
COMMUNITY
Start: 2024-08-09

## 2024-08-12 RX ORDER — LAMOTRIGINE 150 MG/1
150 TABLET ORAL DAILY
COMMUNITY
Start: 2024-08-09

## 2024-08-12 NOTE — PROGRESS NOTES
"Chief Complaint  Follow-up, Diabetes, and Hypertension    Subjective        Vivian Galvez presents to [facility]  Follow-up  Conditions present:  Diabetes and Hypertension    Diabetes: She has type 2 diabetes mellitus. Current diabetic treatment: diet and non-insulin injection (glp-1). She monitors blood glucose at home not monitored.   Hypertension: The problem is controlled. Past treatments: diuretics and ACE inhibitors.   Diabetes    Hypertension      She switched mental health provider. Goal to come off some medications. She is on lower doses and weaning. Hard time with depression and lack of motivation. She has talk therapy as part of treatment plan. She hasn't had much activity.     She was finally able to get Trulicity 4.5mg. A couple months at 1.5 mg dose.       Objective   Vital Signs:  /66   Pulse 78   Ht 154.9 cm (60.98\")   Wt 81.9 kg (180 lb 8 oz)   SpO2 98%   BMI 34.13 kg/m²   Estimated body mass index is 34.13 kg/m² as calculated from the following:    Height as of this encounter: 154.9 cm (60.98\").    Weight as of this encounter: 81.9 kg (180 lb 8 oz).               Physical Exam  Vitals reviewed.   Constitutional:       General: She is not in acute distress.     Appearance: She is obese. She is not ill-appearing.   Cardiovascular:      Rate and Rhythm: Normal rate and regular rhythm.   Pulmonary:      Effort: Pulmonary effort is normal.      Breath sounds: Normal breath sounds.   Neurological:      Mental Status: She is alert.   Psychiatric:         Mood and Affect: Mood is depressed.        Result Review :    The following data was reviewed by: Peyton Arechiga MD on 08/12/2024:  Common labs          11/6/2023    10:40 11/6/2023    11:56 2/7/2024    09:09 2/7/2024    09:31 8/12/2024    09:56   Common Labs   Glucose    127     BUN    16     Creatinine    0.94     Sodium    138     Potassium    4.1     Chloride    99     Calcium    9.6     Albumin    4.1   "   Total Bilirubin    0.4     Alkaline Phosphatase    52     AST (SGOT)    14     ALT (SGPT)    13     WBC    6.64     Hemoglobin    10.9     Hematocrit    36.3     Platelets    478     Total Cholesterol    267     Triglycerides    222     HDL Cholesterol    69     LDL Cholesterol     158     Hemoglobin A1C 5.7   6.2   6.7    Microalbumin, Urine  <1.2         A1C Last 3 Results          11/6/2023    10:40 2/7/2024    09:09 8/12/2024    09:56   HGBA1C Last 3 Results   Hemoglobin A1C 5.7  6.2  6.7                   Assessment and Plan     Diagnoses and all orders for this visit:    1. Type 2 diabetes mellitus with hyperglycemia, without long-term current use of insulin (Primary)  -     POC Glycosylated Hemoglobin (Hb A1C)  -     Cancel: POCT microalbumin  -     MicroAlbumin, Urine, Random - Urine, Clean Catch; Future  Uncontrolled.  Worsening A1c due to lack of availability of Trulicity.  She has back at the Trulicity 4.5 mg dose.  She was intolerant to metformin.  I recommend continuing the Trulicity for the next 3 to 6 months.  She will send me her blood sugar log in 3 months to review.  2. Primary hypertension  Stable.  Continue hydrochlorothiazide and lisinopril.  Discussed if her blood pressure lowers may be able to reduce doses of medications in the future.           Follow Up     Return in about 26 weeks (around 2/10/2025) for Physical and fasting labs, as scheduled.  Patient was given instructions and counseling regarding her condition or for health maintenance advice. Please see specific information pulled into the AVS if appropriate.     Electronically signed by Peyton Arechiga MD, 08/12/24, 10:06 AM EDT.

## 2024-08-13 LAB — ALBUMIN UR-MCNC: <1.2 MG/DL

## 2024-08-21 ENCOUNTER — HOSPITAL ENCOUNTER (OUTPATIENT)
Dept: MAMMOGRAPHY | Facility: HOSPITAL | Age: 64
Discharge: HOME | End: 2024-08-21
Admitting: FAMILY MEDICINE
Payer: COMMERCIAL

## 2024-08-21 DIAGNOSIS — Z12.31 VISIT FOR SCREENING MAMMOGRAM: ICD-10-CM

## 2024-08-21 PROCEDURE — 77067 SCR MAMMO BI INCL CAD: CPT

## 2024-08-21 PROCEDURE — 77063 BREAST TOMOSYNTHESIS BI: CPT

## 2024-10-25 RX ORDER — CYANOCOBALAMIN 1000 UG/ML
INJECTION, SOLUTION INTRAMUSCULAR; SUBCUTANEOUS
Qty: 3 ML | Refills: 9 | Status: SHIPPED | OUTPATIENT
Start: 2024-10-25

## 2024-10-25 NOTE — TELEPHONE ENCOUNTER
Rx Refill Note  Requested Prescriptions     Pending Prescriptions Disp Refills    cyanocobalamin 1000 MCG/ML injection [Pharmacy Med Name: CYANOCOBALAMIN 1,000 MCG/ML MDV] 3 mL      Sig: INJECT 1 MILLILITER INJECTION ONCE A MONTH      Last office visit with prescribing clinician: 8/12/2024   Last telemedicine visit with prescribing clinician: Visit date not found   Next office visit with prescribing clinician: 2/10/2025                         Would you like a call back once the refill request has been completed: [] Yes [] No    If the office needs to give you a call back, can they leave a voicemail: [] Yes [] No    Irene Murillo MA  10/25/24, 09:04 EDT

## 2024-11-04 DIAGNOSIS — I10 PRIMARY HYPERTENSION: ICD-10-CM

## 2024-11-04 RX ORDER — HYDROCHLOROTHIAZIDE 25 MG/1
25 TABLET ORAL DAILY
Qty: 90 TABLET | Refills: 0 | Status: SHIPPED | OUTPATIENT
Start: 2024-11-04

## 2024-11-04 RX ORDER — LISINOPRIL 40 MG/1
40 TABLET ORAL DAILY
Qty: 90 TABLET | Refills: 0 | Status: SHIPPED | OUTPATIENT
Start: 2024-11-04

## 2024-11-07 DIAGNOSIS — A60.04 HERPES SIMPLEX VULVOVAGINITIS: ICD-10-CM

## 2024-11-08 RX ORDER — VALACYCLOVIR HYDROCHLORIDE 500 MG/1
TABLET, FILM COATED ORAL
Qty: 12 TABLET | Refills: 3 | Status: SHIPPED | OUTPATIENT
Start: 2024-11-08

## 2024-11-08 NOTE — TELEPHONE ENCOUNTER
Rx Refill Note  Requested Prescriptions     Pending Prescriptions Disp Refills    valACYclovir (VALTREX) 500 MG tablet [Pharmacy Med Name: valACYclovir  MG TABLET] 12 tablet 3     Sig: TAKE 1 TABLET BY MOUTH TWICE A DAY FOR 3 DAYS PER EPISODE      Last office visit with prescribing clinician: 8/12/2024   Last telemedicine visit with prescribing clinician: Visit date not found   Next office visit with prescribing clinician: 2/10/2025   {  Shireen Riddle MA  11/08/24, 12:49 EST

## 2024-11-12 ENCOUNTER — LAB (OUTPATIENT)
Age: 64
End: 2024-11-12
Payer: COMMERCIAL

## 2024-11-12 ENCOUNTER — OFFICE VISIT (OUTPATIENT)
Age: 64
End: 2024-11-12
Payer: COMMERCIAL

## 2024-11-12 VITALS
HEIGHT: 61 IN | HEART RATE: 88 BPM | DIASTOLIC BLOOD PRESSURE: 74 MMHG | RESPIRATION RATE: 18 BRPM | BODY MASS INDEX: 33.09 KG/M2 | OXYGEN SATURATION: 98 % | SYSTOLIC BLOOD PRESSURE: 118 MMHG | WEIGHT: 175.25 LBS

## 2024-11-12 DIAGNOSIS — R53.83 OTHER FATIGUE: ICD-10-CM

## 2024-11-12 DIAGNOSIS — E53.8 B12 DEFICIENCY: ICD-10-CM

## 2024-11-12 DIAGNOSIS — R71.8 MICROCYTOSIS: ICD-10-CM

## 2024-11-12 DIAGNOSIS — E11.9 TYPE 2 DIABETES MELLITUS WITHOUT COMPLICATION, WITHOUT LONG-TERM CURRENT USE OF INSULIN: Primary | ICD-10-CM

## 2024-11-12 DIAGNOSIS — F32.2 SEVERE DEPRESSION: ICD-10-CM

## 2024-11-12 DIAGNOSIS — E55.9 VITAMIN D DEFICIENCY: ICD-10-CM

## 2024-11-12 DIAGNOSIS — R41.3 MEMORY LOSS: ICD-10-CM

## 2024-11-12 LAB
DEPRECATED RDW RBC AUTO: 35.9 FL (ref 37–54)
ERYTHROCYTE [DISTWIDTH] IN BLOOD BY AUTOMATED COUNT: 14.7 % (ref 12.3–15.4)
EXPIRATION DATE: ABNORMAL
HBA1C MFR BLD: 6.6 % (ref 4.5–5.7)
HCT VFR BLD AUTO: 40.5 % (ref 34–46.6)
HGB BLD-MCNC: 12 G/DL (ref 12–15.9)
Lab: ABNORMAL
MCH RBC QN AUTO: 20.7 PG (ref 26.6–33)
MCHC RBC AUTO-ENTMCNC: 29.6 G/DL (ref 31.5–35.7)
MCV RBC AUTO: 69.7 FL (ref 79–97)
PLATELET # BLD AUTO: 447 10*3/MM3 (ref 140–450)
PMV BLD AUTO: 10.2 FL (ref 6–12)
RBC # BLD AUTO: 5.81 10*6/MM3 (ref 3.77–5.28)
WBC NRBC COR # BLD AUTO: 7.38 10*3/MM3 (ref 3.4–10.8)

## 2024-11-12 PROCEDURE — 82728 ASSAY OF FERRITIN: CPT | Performed by: FAMILY MEDICINE

## 2024-11-12 PROCEDURE — 84466 ASSAY OF TRANSFERRIN: CPT | Performed by: FAMILY MEDICINE

## 2024-11-12 PROCEDURE — 82306 VITAMIN D 25 HYDROXY: CPT | Performed by: FAMILY MEDICINE

## 2024-11-12 PROCEDURE — 83036 HEMOGLOBIN GLYCOSYLATED A1C: CPT | Performed by: FAMILY MEDICINE

## 2024-11-12 PROCEDURE — 99214 OFFICE O/P EST MOD 30 MIN: CPT | Performed by: FAMILY MEDICINE

## 2024-11-12 PROCEDURE — 80050 GENERAL HEALTH PANEL: CPT | Performed by: FAMILY MEDICINE

## 2024-11-12 PROCEDURE — 82607 VITAMIN B-12: CPT | Performed by: FAMILY MEDICINE

## 2024-11-12 PROCEDURE — 83540 ASSAY OF IRON: CPT | Performed by: FAMILY MEDICINE

## 2024-11-12 PROCEDURE — 36415 COLL VENOUS BLD VENIPUNCTURE: CPT | Performed by: FAMILY MEDICINE

## 2024-11-12 NOTE — PROGRESS NOTES
Chief Complaint  Diabetes, Fatigue, Generalized Body Aches, Depression, and Insomnia    Subjective        Vivian Galvez presents to [facility]  History of Present Illness    History of Present Illness  The patient is a 64-year-old female presenting today for follow-up of diabetes, fatigue, body aches, and depression.    She monitors her blood sugar levels mid-morning after a light meal, with the highest recorded level being 110. She has not experienced any readings in the 200s. She is using  her Trulicity prescription.    She has been grappling with depression and severe fatigue for an extended period. Despite undergoing various tests and therapies, she continues to feel unwell. She has been exploring different mental health regimens, including the use of herbal supplement and magnesium, which had mixed results. She decided against treatments like nasal spray and ketamine. Currently, she is taking trazodone for sleep and has discontinued Lamictal and Cymbalta. She previously took Trintellix but is not on it at present. She is considering resuming medication due to persistent fatigue, which leaves her feeling achy and exhausted even after minor household chores. She often needs to rest and finds herself leaning on things for support. She also experiences a foggy feeling, difficulty concentrating, and memory issues, which she suspects may be related to her depression and anxiety. She has researched her symptoms online and believes they align with chronic fatigue syndrome. Rest does not seem to alleviate her symptoms, and she has to carefully plan her day to accomplish even small tasks. She is unsure if these symptoms are due to her lack of medication.    She also experiences shortness of breath, which she does not believe is related to her chest, but rather feels like a need to step back and relax. This symptom is concerning to her and exacerbates her depression. She is currently seeing  "a psychiatrist she had seen years ago, despite the cost, as she feels comfortable with her. However, she has only had two appointments in the last four months, one of which was a telehealth visit. She expressed a desire to reduce her antidepressant intake, but this may not be feasible. She feels that none of the recent medications have been effective.     She has been advised to check her hormones, thyroid, and blood cells. She has been prescribed B12 once a month but often forgets to take it. She is unsure if she needs to take it more frequently or if it would make a difference.    She feels that her condition is significantly impacting her life, as she no longer enjoys activities she used to, such as reading and puzzles. She has tried various sleep aids, including trazodone and Lunesta, which she took twice and found effective. However, she still wakes up during the night and feels groggy upon waking. She wishes she could nap during the day and wake up feeling refreshed, but this is not the case.    Objective   Vital Signs:  /74   Pulse 88   Resp 18   Ht 154.9 cm (60.98\")   Wt 79.5 kg (175 lb 4 oz)   SpO2 98%   BMI 33.13 kg/m²   Estimated body mass index is 33.13 kg/m² as calculated from the following:    Height as of this encounter: 154.9 cm (60.98\").    Weight as of this encounter: 79.5 kg (175 lb 4 oz).            The following portions of the patient's history were reviewed and updated as appropriate: allergies, current medications, past family history, past medical history, past social history, past surgical history, and problem list.       Physical Exam  Vitals reviewed.   Constitutional:       General: She is not in acute distress.     Appearance: She is not ill-appearing.   Neck:      Thyroid: No thyroid mass, thyromegaly or thyroid tenderness.   Cardiovascular:      Rate and Rhythm: Normal rate and regular rhythm.   Pulmonary:      Effort: Pulmonary effort is normal.      Breath sounds: Normal " breath sounds.   Abdominal:      Palpations: There is no hepatomegaly or splenomegaly.      Tenderness: There is no abdominal tenderness.   Lymphadenopathy:      Head:      Right side of head: No submandibular, tonsillar, preauricular or posterior auricular adenopathy.      Left side of head: No submandibular, tonsillar, preauricular or posterior auricular adenopathy.      Cervical: No cervical adenopathy.      Right cervical: No superficial cervical adenopathy.     Left cervical: No superficial cervical adenopathy.      Upper Body:      Right upper body: No supraclavicular or axillary adenopathy.      Left upper body: No supraclavicular or axillary adenopathy.      Lower Body: No right inguinal adenopathy. No left inguinal adenopathy.   Neurological:      Mental Status: She is alert.   Psychiatric:         Mood and Affect: Mood is depressed.        Result Review :  The following data was reviewed by: Peyton Arechiga MD on 11/12/2024:  Common labs          2/7/2024    09:09 2/7/2024    09:31 8/12/2024    09:56 8/12/2024    10:18 11/12/2024    14:19   Common Labs   Glucose  127       BUN  16       Creatinine  0.94       Sodium  138       Potassium  4.1       Chloride  99       Calcium  9.6       Albumin  4.1       Total Bilirubin  0.4       Alkaline Phosphatase  52       AST (SGOT)  14       ALT (SGPT)  13       WBC  6.64       Hemoglobin  10.9       Hematocrit  36.3       Platelets  478       Total Cholesterol  267       Triglycerides  222       HDL Cholesterol  69       LDL Cholesterol   158       Hemoglobin A1C 6.2   6.7   6.6    Microalbumin, Urine    <1.2       A1C Last 3 Results          2/7/2024    09:09 8/12/2024    09:56 11/12/2024    14:19   HGBA1C Last 3 Results   Hemoglobin A1C 6.2  6.7  6.6           Vitamin B12 (02/07/2024 09:31)  Vitamin D,25-Hydroxy (02/07/2024 09:31)     Assessment and Plan   Diagnoses and all orders for this visit:    1. Type 2 diabetes mellitus without complication, without  long-term current use of insulin (Primary)  -     POC Glycosylated Hemoglobin (Hb A1C)    2. Severe depression    3. Other fatigue  -     TSH Rfx On Abnormal To Free T4; Future  -     Comprehensive Metabolic Panel; Future    4. Memory loss  -     MRI Brain Without Contrast; Future    5. B12 deficiency  -     Vitamin B12; Future  -     CBC (No Diff); Future  -     Methylmalonic Acid, Serum; Future    6. Vitamin D deficiency  -     Vitamin D,25-Hydroxy; Future             Assessment & Plan  1. Diabetes Mellitus.  Her A1c level was 6.7 in August 2024 and has slightly decreased to 6.6 today. The target A1c level is around 6.5, which is achievable with the current Trulicity regimen. She does not need any refills of Trulicity as she picked it up a couple of days ago. An A1c test will be conducted today to assess the effectiveness of Trulicity.  Recheck in 3 months.    2. Depression.  She has been struggling with depression and fatigue for a long time. She has tried various medications including Lamictal, Cymbalta, and Trintellix, but is currently not on any antidepressants. She is advised to continue her consultations with her mental health provider. She has been taking trazodone for sleep but recently started Lunesta, which has helped her sleep but leaves her feeling groggy.     3. Fatigue.  She reports significant fatigue that does not improve with rest, and she feels constantly tired and achy. Blood work will be conducted to rule out treatable conditions such as B12 deficiency or thyroid disorders. Her vitamin D level was previously low and will be rechecked. An MRI of the brain will be performed to investigate potential causes of her cognitive changes and memory issues.    4.  Memory loss.  MRI of the brain will be ordered to check for any brain abnormalities contributing to concentration and memory difficulties.    Follow-up  She will return for a physical in February 2025 or sooner if necessary.      Follow Up    Return in about 3 months (around 2/10/2025) for as scheduled.  Patient was given instructions and counseling regarding her condition or for health maintenance advice. Please see specific information pulled into the AVS if appropriate.         Patient or patient representative verbalized consent for the use of Ambient Listening during the visit with  Peyton Arechiga MD for chart documentation. 11/12/2024  15:26 EST    Electronically signed by Peyton Arechiga MD, 11/12/24, 3:27 PM EST.

## 2024-11-13 DIAGNOSIS — E55.9 VITAMIN D DEFICIENCY: Primary | ICD-10-CM

## 2024-11-13 DIAGNOSIS — R71.8 MICROCYTOSIS: ICD-10-CM

## 2024-11-13 LAB
25(OH)D3 SERPL-MCNC: 26.5 NG/ML (ref 30–100)
ALBUMIN SERPL-MCNC: 4.6 G/DL (ref 3.5–5.2)
ALBUMIN/GLOB SERPL: 1.7 G/DL
ALP SERPL-CCNC: 53 U/L (ref 39–117)
ALT SERPL W P-5'-P-CCNC: 23 U/L (ref 1–33)
ANION GAP SERPL CALCULATED.3IONS-SCNC: 14 MMOL/L (ref 5–15)
AST SERPL-CCNC: 22 U/L (ref 1–32)
BILIRUB SERPL-MCNC: 1 MG/DL (ref 0–1.2)
BUN SERPL-MCNC: 16 MG/DL (ref 8–23)
BUN/CREAT SERPL: 16.3 (ref 7–25)
CALCIUM SPEC-SCNC: 10.3 MG/DL (ref 8.6–10.5)
CHLORIDE SERPL-SCNC: 98 MMOL/L (ref 98–107)
CO2 SERPL-SCNC: 25 MMOL/L (ref 22–29)
CREAT SERPL-MCNC: 0.98 MG/DL (ref 0.57–1)
EGFRCR SERPLBLD CKD-EPI 2021: 65 ML/MIN/1.73
FERRITIN SERPL-MCNC: 44.9 NG/ML (ref 13–150)
GLOBULIN UR ELPH-MCNC: 2.7 GM/DL
GLUCOSE SERPL-MCNC: 119 MG/DL (ref 65–99)
IRON 24H UR-MRATE: 117 MCG/DL (ref 37–145)
IRON SATN MFR SERPL: 24 % (ref 20–50)
POTASSIUM SERPL-SCNC: 4 MMOL/L (ref 3.5–5.2)
PROT SERPL-MCNC: 7.3 G/DL (ref 6–8.5)
SODIUM SERPL-SCNC: 137 MMOL/L (ref 136–145)
TIBC SERPL-MCNC: 484 MCG/DL (ref 298–536)
TRANSFERRIN SERPL-MCNC: 325 MG/DL (ref 200–360)
TSH SERPL DL<=0.05 MIU/L-ACNC: 0.63 UIU/ML (ref 0.27–4.2)
VIT B12 BLD-MCNC: 809 PG/ML (ref 211–946)

## 2024-11-13 RX ORDER — ERGOCALCIFEROL 1.25 MG/1
50000 CAPSULE, LIQUID FILLED ORAL WEEKLY
Qty: 12 CAPSULE | Refills: 1 | Status: SHIPPED | OUTPATIENT
Start: 2024-11-13 | End: 2025-05-12

## 2024-11-18 LAB — METHYLMALONATE SERPL-SCNC: 209 NMOL/L (ref 0–378)

## 2024-11-20 ENCOUNTER — PRIOR AUTHORIZATION (OUTPATIENT)
Age: 64
End: 2024-11-20
Payer: COMMERCIAL

## 2024-11-21 NOTE — TELEPHONE ENCOUNTER
Emgality 120MG/ML auto-injectors (migraine) has been approved.  This request is approved from 11/20/2024 to 11/20/2025.  
Key: [id number]    Drug:  Emgality 120MG/ML auto-injectors (migraine)    Sent to plan-Awaiting response  11/20/24  
Labs/Medications

## 2025-01-07 ENCOUNTER — PRIOR AUTHORIZATION (OUTPATIENT)
Age: 65
End: 2025-01-07
Payer: COMMERCIAL

## 2025-01-07 NOTE — TELEPHONE ENCOUNTER
Key: [id number]    Drug:  Trulicity 4.5MG/0.5ML auto-injectors    Sent to plan-Awaiting response  1/7/25

## 2025-01-08 NOTE — TELEPHONE ENCOUNTER
We’re pleased to let you know that we’ve approved your or your doctor’s request for coverage for TRULICITY 4.5/0.5 INJ. You can now fill your prescription, and it will be covered according to your plan.  As long as you remain covered by your prescription drug plan and there are no changes to your plan benefits, this request is approved from 01/07/2025 to 01/07/2026

## 2025-01-20 RX ORDER — GALCANEZUMAB 120 MG/ML
INJECTION, SOLUTION SUBCUTANEOUS
Qty: 1 ML | Refills: 1 | Status: SHIPPED | OUTPATIENT
Start: 2025-01-20

## 2025-01-20 NOTE — TELEPHONE ENCOUNTER
Rx Refill Note  Requested Prescriptions     Pending Prescriptions Disp Refills    Emgality 120 MG/ML auto-injector pen [Pharmacy Med Name: EMGALITY 120 MG/ML PEN] 1 mL 1     Sig: INJECT CONTENTS OF 1 PEN UNDER THE SKIN EVERY 30 DAYS      Last office visit with prescribing clinician: 11/12/2024   Last telemedicine visit with prescribing clinician: Visit date not found   Next office visit with prescribing clinician: 2/10/2025     Tayla Gloria MA  01/20/25, 10:26 EST    Rx sent

## 2025-02-03 ENCOUNTER — OFFICE VISIT (OUTPATIENT)
Age: 65
End: 2025-02-03
Payer: COMMERCIAL

## 2025-02-03 VITALS
OXYGEN SATURATION: 98 % | HEIGHT: 61 IN | TEMPERATURE: 98.3 F | BODY MASS INDEX: 32.86 KG/M2 | DIASTOLIC BLOOD PRESSURE: 76 MMHG | HEART RATE: 83 BPM | WEIGHT: 174.06 LBS | SYSTOLIC BLOOD PRESSURE: 124 MMHG

## 2025-02-03 DIAGNOSIS — J34.89 SINUS PAIN: Primary | ICD-10-CM

## 2025-02-03 DIAGNOSIS — G44.89 OTHER HEADACHE SYNDROME: ICD-10-CM

## 2025-02-03 DIAGNOSIS — I10 PRIMARY HYPERTENSION: ICD-10-CM

## 2025-02-03 DIAGNOSIS — E78.5 DYSLIPIDEMIA: ICD-10-CM

## 2025-02-03 PROCEDURE — 99214 OFFICE O/P EST MOD 30 MIN: CPT | Performed by: FAMILY MEDICINE

## 2025-02-03 PROCEDURE — 96372 THER/PROPH/DIAG INJ SC/IM: CPT | Performed by: FAMILY MEDICINE

## 2025-02-03 RX ORDER — DULOXETIN HYDROCHLORIDE 30 MG/1
30 CAPSULE, DELAYED RELEASE ORAL DAILY
COMMUNITY
Start: 2025-01-08

## 2025-02-03 RX ORDER — AMOXICILLIN 875 MG/1
875 TABLET, COATED ORAL 2 TIMES DAILY
COMMUNITY
Start: 2025-01-28 | End: 2025-02-10

## 2025-02-03 RX ORDER — LAMOTRIGINE 25 MG/1
25 TABLET ORAL DAILY
COMMUNITY
Start: 2025-01-18

## 2025-02-03 RX ORDER — ESZOPICLONE 3 MG/1
3 TABLET, FILM COATED ORAL NIGHTLY
COMMUNITY
Start: 2025-01-08

## 2025-02-03 RX ORDER — KETOROLAC TROMETHAMINE 30 MG/ML
30 INJECTION, SOLUTION INTRAMUSCULAR; INTRAVENOUS ONCE
Status: COMPLETED | OUTPATIENT
Start: 2025-02-03 | End: 2025-02-03

## 2025-02-03 RX ADMIN — KETOROLAC TROMETHAMINE 30 MG: 30 INJECTION, SOLUTION INTRAMUSCULAR; INTRAVENOUS at 15:04

## 2025-02-03 NOTE — PROGRESS NOTES
"Chief Complaint  Headache (Off and on since December )    Subjective        Vivian Galvez presents to [facility]  History of Present Illness    History of Present Illness  The patient is a 65-year-old female presenting for a headache.    She has been experiencing persistent headaches since Christmas, which have not responded to treatment with prednisone, Flonase, amoxicillin, or ibuprofen. The pain is localized around her eyes, sinuses, and teeth, and is described as a dull ache rather than a throb. She reports no other symptoms except the headache. The pain is exacerbated by movement and noise, leading to a decrease in her daily activities. She also reports tension in her shoulders and neck, and fatigue. She has a history of sinus issues in her youth but has not had them evaluated recently. She has not undergone any sinus procedures. She does not regularly take over-the-counter allergy medications such as Claritin or Zyrtec, but did try Claritin for 2 weeks as recommended by the walk-in clinic. This seemed to alleviate her symptoms for about a week, but they subsequently returned. She does not experience sneezing or runny nose, but does report occasional congestion, particularly at night. The pain is exacerbated by movement and noise. She has been taking Emgality injections for migraines, which she initially found beneficial, but currently feels they are ineffective. She has also tried Tylenol without success. She has been using Flonase nasal spray.    She is on lisinopril for blood pressure management.    MEDICATIONS  Current: Emgality, Flonase, lisinopril, ibuprofen, Tylenol, Claritin.    Objective   Vital Signs:  /76   Pulse 83   Temp 98.3 °F (36.8 °C) (Oral)   Ht 154.9 cm (60.98\")   Wt 79 kg (174 lb 1 oz)   SpO2 98%   BMI 32.91 kg/m²   Estimated body mass index is 32.91 kg/m² as calculated from the following:    Height as of this encounter: 154.9 cm (60.98\").    Weight " as of this encounter: 79 kg (174 lb 1 oz).            The following portions of the patient's history were reviewed and updated as appropriate: allergies, current medications, past family history, past medical history, past social history, past surgical history, and problem list.       Physical Exam  Vitals reviewed.   Constitutional:       General: She is not in acute distress.     Appearance: She is not ill-appearing, toxic-appearing or diaphoretic.   HENT:      Head:        Nose: Congestion present.      Right Nostril: No foreign body or epistaxis.      Left Nostril: No foreign body or epistaxis.      Right Turbinates: Swollen.      Left Turbinates: Swollen.      Right Sinus: Maxillary sinus tenderness and frontal sinus tenderness present.      Left Sinus: Maxillary sinus tenderness and frontal sinus tenderness present.   Neck:     Cardiovascular:      Rate and Rhythm: Normal rate and regular rhythm.   Pulmonary:      Effort: Pulmonary effort is normal.      Breath sounds: Normal breath sounds.   Musculoskeletal:      Cervical back: Muscular tenderness present. Normal range of motion.   Neurological:      General: No focal deficit present.      Mental Status: She is alert. Mental status is at baseline.   Psychiatric:         Mood and Affect: Mood normal.        Result Review :             Current Outpatient Medications   Medication Sig Dispense Refill    amoxicillin (AMOXIL) 875 MG tablet Take 1 tablet by mouth 2 (Two) Times a Day.      atomoxetine (STRATTERA) 25 MG capsule Take 1 capsule by mouth Daily.      atorvastatin (LIPITOR) 20 MG tablet Take 1 tablet by mouth Every Night. 90 tablet 3    cyanocobalamin 1000 MCG/ML injection INJECT 1 MILLILITER INJECTION ONCE A MONTH 3 mL 9    Dulaglutide (Trulicity) 4.5 MG/0.5ML solution pen-injector Inject 0.5 mL under the skin into the appropriate area as directed 1 (One) Time Per Week. 6 mL 1    DULoxetine (CYMBALTA) 30 MG capsule Take 1 capsule by mouth Daily.       "Emgality 120 MG/ML auto-injector pen INJECT CONTENTS OF 1 PEN UNDER THE SKIN EVERY 30 DAYS 1 mL 1    eszopiclone (LUNESTA) 3 MG tablet Take 1 tablet by mouth Every Night.      fluticasone (FLONASE) 50 MCG/ACT nasal spray       Glucose Blood (Blood Glucose Test) strip Use 1 each Daily. 50 each 11    hydroCHLOROthiazide 25 MG tablet TAKE 1 TABLET BY MOUTH DAILY 90 tablet 0    lamoTRIgine (LaMICtal) 25 MG tablet Take 1 tablet by mouth Daily.      Lancets (freestyle) lancets Once a day testing 50 each 11    lisinopril (PRINIVIL,ZESTRIL) 40 MG tablet TAKE 1 TABLET BY MOUTH DAILY 90 tablet 0    Needles & Syringes (Easy Touch Syringe Barrel 1ml) misc 1 each Every 30 (Thirty) Days. 12 each 0    Syringe 20G X 1\" 3 ML misc 1 each Daily. 10 each 5    traZODone (DESYREL) 50 MG tablet TAKE 1/2 TO 1 TABLET BY MOUTH EVERY NIGHT AT BEDTIME AS NEEDED FOR SLEEP 30 tablet 1    valACYclovir (VALTREX) 500 MG tablet TAKE 1 TABLET BY MOUTH TWICE A DAY FOR 3 DAYS PER EPISODE 12 tablet 3    vitamin D (ERGOCALCIFEROL) 1.25 MG (74506 UT) capsule capsule Take 1 capsule by mouth 1 (One) Time Per Week for 180 days. 12 capsule 1     Current Facility-Administered Medications   Medication Dose Route Frequency Provider Last Rate Last Admin    cyanocobalamin injection 1,000 mcg  1,000 mcg Intramuscular Q30 Days Peyton Arechiga MD   1,000 mcg at 03/02/22 1504     She tolerated the injection well.     Assessment and Plan   Diagnoses and all orders for this visit:    1. Sinus pain (Primary)  -     CT Sinus Without Contrast; Future    2. Other headache syndrome  -     CT Sinus Without Contrast; Future  -     ketorolac (TORADOL) injection 30 mg             Assessment & Plan  1. Headache.  The etiology of the headache remains uncertain, with potential contributing factors including sinus issues and muscle soreness. She has been experiencing headaches since [place] and has tried prednisone, Flonase, amoxicillin, ibuprofen, and Tylenol without " significant relief. She reports tenderness in the scalp and neck, which may also be contributing to her symptoms. A CT scan of the sinus cavities will be ordered to further investigate the cause of her pain. A Toradol injection will be administered today to help alleviate her pain. She is advised to monitor her blood pressure tonight and in the morning, and to report any elevations. If the CT scan shows sinus issues, stronger antibiotics may be considered. If there are no signs of sinus infection, further evaluation will be needed.    2. Blood pressure management.  Her blood pressure is well controlled on lisinopril. She is advised to monitor her blood pressure tonight and in the morning, and to report any elevations.    Follow-up  The patient will follow up next week for diabetes management.    PROCEDURE  A Toradol injection was administered today to help alleviate her pain.      Follow Up   Return for Office visit, as scheduled.  Patient was given instructions and counseling regarding her condition or for health maintenance advice. Please see specific information pulled into the AVS if appropriate.         Patient or patient representative verbalized consent for the use of Ambient Listening during the visit with  Peyton Arechiga MD for chart documentation. 2/3/2025  15:29 EST      Electronically signed by Peyton Arechiga MD, 02/03/25, 3:29 PM EST.

## 2025-02-04 RX ORDER — ATORVASTATIN CALCIUM 20 MG/1
20 TABLET, FILM COATED ORAL NIGHTLY
Qty: 90 TABLET | Refills: 3 | Status: SHIPPED | OUTPATIENT
Start: 2025-02-04

## 2025-02-04 RX ORDER — HYDROCHLOROTHIAZIDE 25 MG/1
25 TABLET ORAL DAILY
Qty: 90 TABLET | Refills: 0 | Status: SHIPPED | OUTPATIENT
Start: 2025-02-04

## 2025-02-04 RX ORDER — LISINOPRIL 40 MG/1
40 TABLET ORAL DAILY
Qty: 90 TABLET | Refills: 0 | Status: SHIPPED | OUTPATIENT
Start: 2025-02-04

## 2025-02-10 ENCOUNTER — PRIOR AUTHORIZATION (OUTPATIENT)
Age: 65
End: 2025-02-10
Payer: COMMERCIAL

## 2025-02-10 ENCOUNTER — OFFICE VISIT (OUTPATIENT)
Age: 65
End: 2025-02-10
Payer: COMMERCIAL

## 2025-02-10 VITALS
BODY MASS INDEX: 32.58 KG/M2 | OXYGEN SATURATION: 98 % | HEART RATE: 100 BPM | WEIGHT: 172.56 LBS | DIASTOLIC BLOOD PRESSURE: 76 MMHG | HEIGHT: 61 IN | SYSTOLIC BLOOD PRESSURE: 126 MMHG

## 2025-02-10 DIAGNOSIS — E11.65 TYPE 2 DIABETES MELLITUS WITH HYPERGLYCEMIA, WITHOUT LONG-TERM CURRENT USE OF INSULIN: ICD-10-CM

## 2025-02-10 DIAGNOSIS — E55.9 VITAMIN D DEFICIENCY: ICD-10-CM

## 2025-02-10 DIAGNOSIS — E78.5 DYSLIPIDEMIA: ICD-10-CM

## 2025-02-10 DIAGNOSIS — I10 PRIMARY HYPERTENSION: ICD-10-CM

## 2025-02-10 DIAGNOSIS — Z00.00 WELL ADULT EXAM: Primary | ICD-10-CM

## 2025-02-10 DIAGNOSIS — Z23 NEED FOR VACCINATION: ICD-10-CM

## 2025-02-10 DIAGNOSIS — Z12.11 SCREENING FOR MALIGNANT NEOPLASM OF COLON: ICD-10-CM

## 2025-02-10 LAB
EXPIRATION DATE: ABNORMAL
HBA1C MFR BLD: 6.8 % (ref 4.5–5.7)
Lab: ABNORMAL

## 2025-02-10 PROCEDURE — 90656 IIV3 VACC NO PRSV 0.5 ML IM: CPT | Performed by: FAMILY MEDICINE

## 2025-02-10 PROCEDURE — 83036 HEMOGLOBIN GLYCOSYLATED A1C: CPT | Performed by: FAMILY MEDICINE

## 2025-02-10 PROCEDURE — 90471 IMMUNIZATION ADMIN: CPT | Performed by: FAMILY MEDICINE

## 2025-02-10 PROCEDURE — 99214 OFFICE O/P EST MOD 30 MIN: CPT | Performed by: FAMILY MEDICINE

## 2025-02-10 PROCEDURE — 99396 PREV VISIT EST AGE 40-64: CPT | Performed by: FAMILY MEDICINE

## 2025-02-10 NOTE — TELEPHONE ENCOUNTER
Key: [id number]    Drug:  Mounjaro 2.5MG/0.5ML auto-injectors    Sent to plan-Awaiting response  2/10/25

## 2025-02-10 NOTE — PROGRESS NOTES
"Chief Complaint  Annual Exam    Subjective              Vivian Galvez presents to [facility] for   History of Present Illness  History of Present Illness  The patient is a 65-year-old female presenting for an annual physical exam and diabetes management.    She has expressed interest in receiving the influenza vaccine during this visit. She maintains regular dental check-ups and has an upcoming appointment next month due to recent onset of tooth sensitivity. She has been using toothpaste formulated for sensitive teeth. She has observed a correlation between her sinus issues and dental discomfort, noting that her teeth tend to ache when her sinuses are inflamed. She has previously sought dental care for toothaches, only to discover that the root cause was sinus-related.    She has been experiencing weight management difficulties. She administers B12 injections at home with the assistance of her daughter.    She has been experiencing frequent illnesses recently. She has a scheduled eye examination in March 2025. She is currently on a regimen of Trulicity 4.5 once weekly for diabetes management and has recently completed a course of steroids.    She has not yet undergone a CT scan for her sinuses. She experienced a few days of mild headaches following her injection, but these have since resolved. She reports feeling better overall.        IMMUNIZATIONS  She is current on her tetanus and pneumonia vaccines.    Objective   Vital Signs:   Vitals:    02/10/25 1032   BP: 126/76   BP Location: Left arm   Patient Position: Sitting   Cuff Size: Adult   Pulse: 100   SpO2: 98%   Weight: 78.3 kg (172 lb 9 oz)   Height: 154 cm (60.63\")     Body mass index is 33 kg/m².            The following portions of the patient's history were reviewed and updated as appropriate: allergies, current medications, past family history, past medical history, past social history, past surgical history, and problem " list.      Physical Exam  Constitutional:       General: She is not in acute distress.     Appearance: She is obese.   HENT:      Right Ear: Tympanic membrane and ear canal normal.      Left Ear: Tympanic membrane and ear canal normal.      Nose: No congestion or rhinorrhea.      Mouth/Throat:      Mouth: Mucous membranes are moist.      Pharynx: No oropharyngeal exudate or posterior oropharyngeal erythema.   Eyes:      General:         Right eye: No discharge.         Left eye: No discharge.      Conjunctiva/sclera: Conjunctivae normal.   Neck:      Thyroid: No thyromegaly.   Cardiovascular:      Rate and Rhythm: Normal rate and regular rhythm.      Pulses:           Dorsalis pedis pulses are 2+ on the right side and 2+ on the left side.        Posterior tibial pulses are 2+ on the right side and 2+ on the left side.   Pulmonary:      Effort: Pulmonary effort is normal.      Breath sounds: Normal breath sounds.   Abdominal:      Palpations: Abdomen is soft. There is no hepatomegaly.      Tenderness: There is no abdominal tenderness.   Musculoskeletal:      Cervical back: Neck supple.      Right lower leg: No edema.      Left lower leg: No edema.   Feet:      Right foot:      Protective Sensation: 6 sites tested.  6 sites sensed.      Skin integrity: Skin integrity normal.      Left foot:      Protective Sensation: 6 sites tested.  6 sites sensed.      Skin integrity: Skin integrity normal.      Comments: Diabetic Foot Exam Performed and Monofilament Test Performed            Lymphadenopathy:      Head:      Right side of head: No submandibular, preauricular or posterior auricular adenopathy.      Left side of head: No submandibular, preauricular or posterior auricular adenopathy.      Cervical: No cervical adenopathy.   Skin:     General: Skin is warm.   Neurological:      Mental Status: She is alert and oriented to person, place, and time.   Psychiatric:         Mood and Affect: Mood normal.         Behavior:  Behavior normal.         Thought Content: Thought content normal.         Judgment: Judgment normal.        Result Review :   The following data was reviewed by: Peyton Arechiga MD on 02/10/2025:  Common labs          8/12/2024    09:56 8/12/2024    10:18 11/12/2024    14:19 11/12/2024    14:29 2/10/2025    10:41   Common Labs   Glucose    119     BUN    16     Creatinine    0.98     Sodium    137     Potassium    4.0     Chloride    98     Calcium    10.3     Albumin    4.6     Total Bilirubin    1.0     Alkaline Phosphatase    53     AST (SGOT)    22     ALT (SGPT)    23     WBC    7.38     Hemoglobin    12.0     Hematocrit    40.5     Platelets    447     Hemoglobin A1C 6.7   6.6   6.8    Microalbumin, Urine  <1.2         A1C Last 3 Results          8/12/2024    09:56 11/12/2024    14:19 2/10/2025    10:41   HGBA1C Last 3 Results   Hemoglobin A1C 6.7  6.6  6.8               Immunization History   Administered Date(s) Administered    COVID-19 (MODERNA) 1st,2nd,3rd Dose Monovalent 01/28/2021, 02/25/2021, 12/17/2021    COVID-19 (PFIZER) BIVALENT 12+YRS 11/18/2022    Fluzone (or Fluarix & Flulaval for VFC) >6mos 11/01/2021, 11/18/2022, 10/05/2023    Hepatitis A 11/13/2019    Influenza, Unspecified 02/15/2024    Pneumococcal Conjugate 20-Valent (PCV20) 08/12/2022    Tdap 10/05/2023       Health Maintenance   Topic Date Due    ZOSTER VACCINE (1 of 2) Never done    HEPATITIS C SCREENING  Never done    COLORECTAL CANCER SCREENING  02/13/2024    INFLUENZA VACCINE  07/01/2024    COVID-19 Vaccine (5 - 2024-25 season) 09/01/2024    ANNUAL PHYSICAL  02/07/2025    DIABETIC FOOT EXAM  02/07/2025    BMI FOLLOWUP  02/07/2025    HEMOGLOBIN A1C  05/10/2025    DIABETIC EYE EXAM  04/15/2025    MAMMOGRAM  08/21/2025    PAP SMEAR  01/18/2027    TDAP/TD VACCINES (2 - Td or Tdap) 10/05/2033    Pneumococcal Vaccine 0-64  Completed    URINE MICROALBUMIN  Discontinued            Assessment and Plan    Diagnoses and all orders for this  visit:    1. Well adult exam (Primary)  -     CBC (No Diff); Future  -     Comprehensive Metabolic Panel; Future  -     Lipid Panel; Future    2. Type 2 diabetes mellitus without complication, without long-term current use of insulin  -     POC Glycosylated Hemoglobin (Hb A1C)    3. Need for vaccination  -     Fluzone >6mos    4. Primary hypertension    5. Dyslipidemia    6. Screening for malignant neoplasm of colon  -     Ambulatory Referral For Screening Colonoscopy    7. Vitamin D deficiency  -     Vitamin D,25-Hydroxy; Future        Assessment & Plan  1. Annual physical examination.  Her tetanus and pneumonia vaccinations are up-to-date. She underwent a mammogram in August 2024 and a colonoscopy in 2019, with a recommendation for a follow-up colonoscopy every 5 years. Her last eye examination was conducted in April 2024. A comprehensive metabolic panel, complete blood count, and vitamin D level will be ordered. She will receive her influenza vaccine today. A referral for a colonoscopy will be made. She is advised to maintain hand hygiene while awaiting the protective effects of the influenza vaccine.    2. Diabetes Mellitus.  Her A1c levels have increased from 6.6 in November 2024 to 6.8 today. She has been on Trulicity 4.5 mg once a week. The recent increase in A1c may be due to a recent round of steroids and changes in eating habits over the holidays.  Change from Trulicity to Mounjaro.  A prescription for Mounjaro, starting at 2.5 mg once a week, will be provided, with a plan to increase the dose monthly. She will continue Trulicity until Mounjaro is available at the pharmacy. Potential side effects, including abdominal pain, nausea, and constipation, were discussed. She will continue her current regimen of atorvastatin at night. Fasting blood work will be done to monitor the effectiveness of the medication.    3. Hypertension.  Her blood pressure is well-controlled with the current combination of  hydrochlorothiazide and lisinopril. She will continue with this medication regimen.    4. Chronic Sinusitis.  She has a history of sinus issues and has experienced some relief from a recent shot. A CT scan of the sinuses is pending to evaluate for chronic sinusitis and determine if ENT intervention is needed.    5. Vitamin D Deficiency.  Her vitamin D level was low at 26 in November 2024. A follow-up vitamin D level will be checked to avoid excessive buildup on the current dose of prescription vitamin D.    6.  Hyperlipidemia.  LDL goal less than 100.  Continue atorvastatin.      Follow-up  The patient will follow up in 3 months.    PROCEDURE  Colonoscopy was performed in 2019.    Counseling/anticipatory guidance:  dental health, eye exam, immunizations, screenings      Follow Up   Return in about 3 months (around 5/10/2025) for Office visit diabetes AND , Physical and fasting labs 1 year.  Patient was given instructions and counseling regarding her condition or for health maintenance advice. Please see specific information pulled into the AVS if appropriate.     Patient or patient representative verbalized consent for the use of Ambient Listening during the visit with  Peyton Arechiga MD for chart documentation. 2/10/2025  11:26 EST     Electronically signed by Peyton Arechiga MD, 02/10/25, 11:26 AM EST.

## 2025-02-10 NOTE — TELEPHONE ENCOUNTER
We’re pleased to let you know that we’ve approved your or your doctor’s request for coverage for MOUNJARO 2.5/0.5 INJ. You can now fill your prescription, and it will be covered according to your plan.  As long as you remain covered by your prescription drug plan and there are no changes to your plan benefits, this request is approved from 02/10/2025 to 02/10/2026.

## 2025-03-14 ENCOUNTER — PATIENT MESSAGE (OUTPATIENT)
Age: 65
End: 2025-03-14
Payer: COMMERCIAL

## 2025-04-10 ENCOUNTER — PATIENT MESSAGE (OUTPATIENT)
Age: 65
End: 2025-04-10
Payer: COMMERCIAL

## 2025-05-02 DIAGNOSIS — E11.65 TYPE 2 DIABETES MELLITUS WITH HYPERGLYCEMIA, WITHOUT LONG-TERM CURRENT USE OF INSULIN: ICD-10-CM

## 2025-05-02 NOTE — TELEPHONE ENCOUNTER
Rx Refill Note  Requested Prescriptions     Pending Prescriptions Disp Refills    Mounjaro 2.5 MG/0.5ML solution auto-injector [Pharmacy Med Name: MOUNJARO 2.5 MG/0.5 ML PEN] 2 mL 0     Sig: INJECT 2.5 MG UNDER THE SKIN ONCE WEEKLY      Last office visit with prescribing clinician: 2/10/2025   Last telemedicine visit with prescribing clinician: Visit date not found   Next office visit with prescribing clinician: 5/12/2025     IS patient still taking ?    Rakesh Fontana MA  05/02/25, 12:13 EDT

## 2025-05-05 NOTE — TELEPHONE ENCOUNTER
m for patient to return call   Regarding mounjaro request?      RELAY   Please call the verify dose. I last show that she had the tirzepatide 5 mg weekly. Does she want to refill the 5 mg dose? Does she want to increase to the 7.5 mg dose?

## 2025-05-05 NOTE — TELEPHONE ENCOUNTER
Please call the verify dose.  I last show that she had the tirzepatide 5 mg weekly.  Does she want to refill the 5 mg dose?  Does she want to increase to the 7.5 mg dose?

## 2025-05-05 NOTE — TELEPHONE ENCOUNTER
Rx Refill Note  Requested Prescriptions     Pending Prescriptions Disp Refills    Mounjaro 2.5 MG/0.5ML solution auto-injector [Pharmacy Med Name: MOUNJARO 2.5 MG/0.5 ML PEN] 2 mL 0     Sig: INJECT 2.5 MG UNDER THE SKIN ONCE WEEKLY      Last office visit with prescribing clinician: 2/10/2025   Last telemedicine visit with prescribing clinician: Visit date not found   Next office visit with prescribing clinician: 5/12/2025                         Would you like a call back once the refill request has been completed: [] Yes [] No    If the office needs to give you a call back, can they leave a voicemail: [] Yes [] No    Rakesh Fontana MA  05/05/25, 13:05 EDT

## 2025-05-06 NOTE — TELEPHONE ENCOUNTER
2nd attempt     Lvm for patient to return call   Regarding mounjaro request?        RELAY   Please call the verify dose. I last show that she had the tirzepatide 5 mg weekly. Does she want to refill the 5 mg dose? Does she want to increase to the 7.5 mg dose?

## 2025-05-07 NOTE — TELEPHONE ENCOUNTER
3rd attempt, sending to provider  [facility] for patient to return call   Regarding mounjaro request?        RELAY   Please call the verify dose. I last show that she had the tirzepatide 5 mg weekly. Does she want to refill the 5 mg dose? Does she want to increase to the 7.5 mg dose?

## 2025-05-08 DIAGNOSIS — I10 PRIMARY HYPERTENSION: ICD-10-CM

## 2025-05-08 RX ORDER — LISINOPRIL 40 MG/1
40 TABLET ORAL DAILY
Qty: 90 TABLET | Refills: 1 | Status: SHIPPED | OUTPATIENT
Start: 2025-05-08

## 2025-05-08 RX ORDER — HYDROCHLOROTHIAZIDE 25 MG/1
25 TABLET ORAL DAILY
Qty: 90 TABLET | Refills: 1 | Status: SHIPPED | OUTPATIENT
Start: 2025-05-08

## 2025-05-08 NOTE — TELEPHONE ENCOUNTER
Rx Refill Note  Requested Prescriptions     Pending Prescriptions Disp Refills    hydroCHLOROthiazide 25 MG tablet [Pharmacy Med Name: hydroCHLOROthiazide 25 MG TABLET] 90 tablet 0     Sig: TAKE 1 TABLET BY MOUTH DAILY    lisinopril (PRINIVIL,ZESTRIL) 40 MG tablet [Pharmacy Med Name: LISINOPRIL 40 MG TABLET] 90 tablet 0     Sig: TAKE 1 TABLET BY MOUTH DAILY      Last office visit with prescribing clinician: 2/10/2025   Last telemedicine visit with prescribing clinician: Visit date not found   Next office visit with prescribing clinician: 5/12/2025     Tayla Gloria MA  05/08/25, 07:39 EDT    Rx sent

## 2025-05-12 ENCOUNTER — OFFICE VISIT (OUTPATIENT)
Age: 65
End: 2025-05-12
Payer: COMMERCIAL

## 2025-05-12 VITALS
HEART RATE: 98 BPM | WEIGHT: 168.56 LBS | OXYGEN SATURATION: 98 % | SYSTOLIC BLOOD PRESSURE: 124 MMHG | BODY MASS INDEX: 31.83 KG/M2 | DIASTOLIC BLOOD PRESSURE: 76 MMHG | HEIGHT: 61 IN

## 2025-05-12 DIAGNOSIS — E11.65 TYPE 2 DIABETES MELLITUS WITH HYPERGLYCEMIA, WITHOUT LONG-TERM CURRENT USE OF INSULIN: Primary | ICD-10-CM

## 2025-05-12 DIAGNOSIS — I10 PRIMARY HYPERTENSION: ICD-10-CM

## 2025-05-12 LAB
EXPIRATION DATE: ABNORMAL
HBA1C MFR BLD: 6.6 % (ref 4.5–5.7)
Lab: ABNORMAL

## 2025-05-12 PROCEDURE — 99214 OFFICE O/P EST MOD 30 MIN: CPT | Performed by: FAMILY MEDICINE

## 2025-05-12 PROCEDURE — 82043 UR ALBUMIN QUANTITATIVE: CPT | Performed by: FAMILY MEDICINE

## 2025-05-12 PROCEDURE — 82570 ASSAY OF URINE CREATININE: CPT | Performed by: FAMILY MEDICINE

## 2025-05-12 PROCEDURE — 83036 HEMOGLOBIN GLYCOSYLATED A1C: CPT | Performed by: FAMILY MEDICINE

## 2025-05-12 RX ORDER — TIRZEPATIDE 2.5 MG/.5ML
INJECTION, SOLUTION SUBCUTANEOUS
Qty: 2 ML | Refills: 0 | OUTPATIENT
Start: 2025-05-12

## 2025-05-12 NOTE — PROGRESS NOTES
"Chief Complaint  Diabetes    Subjective        Vivian Galvez presents to [facility]  History of Present Illness    History of Present Illness  The patient is a 65-year-old female presenting for a follow-up on diabetes.    She has been prescribed Mounjaro at a dosage of 5 mg, which she reports as being less effective than Trulicity in managing her blood glucose levels. Her blood glucose levels have been consistently between 120 and 140, a range she perceives to be higher than her previous readings. She reports no adverse effects from the Mounjaro but notes that it does not adequately suppress her appetite. Her A1c has decreased from 6.8 to 6.6. She has observed a slight weight loss, which she attributes to her efforts in reducing sugar intake. However, she acknowledges a continued struggle with carbohydrate consumption. She expresses a desire for increased energy levels.  She anticipates further improvement in her energy levels with the onset of warmer weather, as this would allow her to engage in more outdoor physical activities.    She had a CT scan of the sinuses at [facility].    Objective   Vital Signs:  /76 (BP Location: Left arm, Patient Position: Sitting, Cuff Size: Adult)   Pulse 98   Ht 154 cm (60.63\")   Wt 76.5 kg (168 lb 9 oz)   SpO2 98%   BMI 32.24 kg/m²   Estimated body mass index is 32.24 kg/m² as calculated from the following:    Height as of this encounter: 154 cm (60.63\").    Weight as of this encounter: 76.5 kg (168 lb 9 oz).          The following portions of the patient's history were reviewed and updated as appropriate: allergies, current medications, past family history, past medical history, past social history, past surgical history, and problem list.       Physical Exam  Vitals reviewed.   Constitutional:       General: She is not in acute distress.     Appearance: She is not ill-appearing.   Cardiovascular:      Rate and Rhythm: " Normal rate and regular rhythm.   Pulmonary:      Effort: Pulmonary effort is normal.      Breath sounds: Normal breath sounds.   Neurological:      Mental Status: She is alert.        Result Review :  The following data was reviewed by: Peyton Arechiga MD on 05/12/2025:  Common labs          11/12/2024    14:19 11/12/2024    14:29 2/10/2025    10:41 5/12/2025    11:40   Common Labs   Glucose  119      BUN  16      Creatinine  0.98      Sodium  137      Potassium  4.0      Chloride  98      Calcium  10.3      Albumin  4.6      Total Bilirubin  1.0      Alkaline Phosphatase  53      AST (SGOT)  22      ALT (SGPT)  23      WBC  7.38      Hemoglobin  12.0      Hematocrit  40.5      Platelets  447      Hemoglobin A1C 6.6   6.8  6.6      A1C Last 3 Results          11/12/2024    14:19 2/10/2025    10:41 5/12/2025    11:40   HGBA1C Last 3 Results   Hemoglobin A1C 6.6  6.8  6.6                Assessment and Plan   Diagnoses and all orders for this visit:    1. Type 2 diabetes mellitus with hyperglycemia, without long-term current use of insulin (Primary)  -     POC Glycosylated Hemoglobin (Hb A1C)  -     Microalbumin / Creatinine Urine Ratio - Urine, Clean Catch; Future  -     Microalbumin / Creatinine Urine Ratio - Urine, Clean Catch  -     Tirzepatide 7.5 MG/0.5ML solution auto-injector; Inject 7.5 mg under the skin into the appropriate area as directed 1 (One) Time Per Week.  Dispense: 2 mL; Refill: 0    2. Primary hypertension             Assessment & Plan  1. Diabetes Mellitus.  - Her A1c level has shown improvement, decreasing from 6.8 to 6.6.  - She has also experienced weight loss, with her weight dropping from 180 in August to 168 today.  - The dosage of Mounjaro will be increased to 7.5 mg  If she experiences nausea, vomiting, or bowel movement issues that are not alleviated by a stool softener, the current dose will be maintained for an extended period. The plan is to gradually increase the dose on a monthly  basis, rather than every three months. The subsequent doses will be 10 mg and 12.5 mg, each for a duration of one month. If she observes a decrease in appetite and blood sugar levels, the dose will be maintained at either 10 mg or 12.5 mg, without escalating to 15 mg.  - A urine protein test will be conducted to assess her kidney health, with results expected by tomorrow.     2.  Hypertension.  Stable.  Continue lisinopril and hydrochlorothiazide.      The results of her lab work and CT scan of the sinuses has been requested by fax and will be communicated to her via [facility].    Follow-up  - The patient is scheduled for a follow-up visit in 3 months.      Follow Up   Return in about 3 months (around 8/12/2025) for Office visit diabetes.  Patient was given instructions and counseling regarding her condition or for health maintenance advice. Please see specific information pulled into the AVS if appropriate.         Patient or patient representative verbalized consent for the use of Ambient Listening during the visit with  Peyton Arechiga MD for chart documentation. 5/12/2025  11:50 EDT    Electronically signed by Peyton Arechiga MD, 05/12/25, 12:19 PM EDT.

## 2025-05-13 LAB
ALBUMIN UR-MCNC: 2.1 MG/DL
CREAT UR-MCNC: 112.1 MG/DL
MICROALBUMIN/CREAT UR: 18.7 MG/G (ref 0–29)

## 2025-05-19 RX ORDER — TIRZEPATIDE 5 MG/.5ML
INJECTION, SOLUTION SUBCUTANEOUS
Qty: 2 ML | Refills: 1 | OUTPATIENT
Start: 2025-05-19

## 2025-05-19 NOTE — TELEPHONE ENCOUNTER
Rx Refill Note  Requested Prescriptions     Pending Prescriptions Disp Refills    Mounjaro 5 MG/0.5ML solution auto-injector [Pharmacy Med Name: MOUNJARO 5 MG/0.5 ML PEN] 2 mL 1     Sig: INJECT 5 MG UNDER THE SKIN ONCE WEEKLY      Last office visit with prescribing clinician: 5/12/2025   Last telemedicine visit with prescribing clinician: Visit date not found   Next office visit with prescribing clinician: 8/12/2025                         Would you like a call back once the refill request has been completed: [] Yes [] No    If the office needs to give you a call back, can they leave a voicemail: [] Yes [] No    Rakesh Fontana MA  05/19/25, 07:54 EDT

## 2025-06-02 DIAGNOSIS — E11.65 TYPE 2 DIABETES MELLITUS WITH HYPERGLYCEMIA, WITHOUT LONG-TERM CURRENT USE OF INSULIN: ICD-10-CM

## 2025-06-02 NOTE — TELEPHONE ENCOUNTER
Rx Refill Note  Requested Prescriptions     Pending Prescriptions Disp Refills    Tirzepatide 7.5 MG/0.5ML solution auto-injector 2 mL 0     Sig: Inject 7.5 mg under the skin into the appropriate area as directed 1 (One) Time Per Week.      Last office visit with prescribing clinician: 5/12/2025   Last telemedicine visit with prescribing clinician: Visit date not found   Next office visit with prescribing clinician: 8/12/2025     Tayla Gloria MA  06/02/25, 13:54 EDT    Pt is wanting to increase the dose.

## 2025-06-12 DIAGNOSIS — A60.04 HERPES SIMPLEX VULVOVAGINITIS: ICD-10-CM

## 2025-06-13 RX ORDER — VALACYCLOVIR HYDROCHLORIDE 500 MG/1
TABLET, FILM COATED ORAL
Qty: 12 TABLET | Refills: 3 | Status: SHIPPED | OUTPATIENT
Start: 2025-06-13

## 2025-07-29 RX ORDER — GALCANEZUMAB 120 MG/ML
INJECTION, SOLUTION SUBCUTANEOUS
Qty: 1 ML | Refills: 0 | Status: SHIPPED | OUTPATIENT
Start: 2025-07-29

## 2025-07-29 NOTE — TELEPHONE ENCOUNTER
Rx Refill Note    Requested Prescriptions     Pending Prescriptions Disp Refills    Emgality 120 MG/ML auto-injector pen [Pharmacy Med Name: EMGALITY 120 MG/ML PEN] 1 mL 1     Sig: INJECT CONTENTS UNDER THE SKIN EVERY 30 DAYS      Last office visit with prescribing clinician: 5/12/2025   Last telemedicine visit with prescribing clinician: Visit date not found   Next office visit with prescribing clinician: 8/12/2025

## 2025-08-18 ENCOUNTER — LAB (OUTPATIENT)
Age: 65
End: 2025-08-18
Payer: COMMERCIAL

## 2025-08-18 ENCOUNTER — OFFICE VISIT (OUTPATIENT)
Age: 65
End: 2025-08-18
Payer: COMMERCIAL

## 2025-08-18 VITALS
HEART RATE: 74 BPM | DIASTOLIC BLOOD PRESSURE: 74 MMHG | WEIGHT: 159.06 LBS | SYSTOLIC BLOOD PRESSURE: 118 MMHG | HEIGHT: 61 IN | OXYGEN SATURATION: 98 % | BODY MASS INDEX: 30.03 KG/M2

## 2025-08-18 DIAGNOSIS — E55.9 VITAMIN D DEFICIENCY: ICD-10-CM

## 2025-08-18 DIAGNOSIS — E11.9 TYPE 2 DIABETES MELLITUS WITHOUT COMPLICATION, WITHOUT LONG-TERM CURRENT USE OF INSULIN: Primary | ICD-10-CM

## 2025-08-18 DIAGNOSIS — E53.8 B12 DEFICIENCY: ICD-10-CM

## 2025-08-18 DIAGNOSIS — Z00.00 WELL ADULT EXAM: ICD-10-CM

## 2025-08-18 DIAGNOSIS — R53.83 OTHER FATIGUE: ICD-10-CM

## 2025-08-18 LAB
ALBUMIN SERPL-MCNC: 4.5 G/DL (ref 3.5–5.2)
ALBUMIN/GLOB SERPL: 1.6 G/DL
ALP SERPL-CCNC: 52 U/L (ref 39–117)
ALT SERPL W P-5'-P-CCNC: 18 U/L (ref 1–33)
ANION GAP SERPL CALCULATED.3IONS-SCNC: 14.3 MMOL/L (ref 5–15)
AST SERPL-CCNC: 23 U/L (ref 1–32)
BILIRUB SERPL-MCNC: 0.7 MG/DL (ref 0–1.2)
BUN SERPL-MCNC: 13 MG/DL (ref 8–23)
BUN/CREAT SERPL: 17.3 (ref 7–25)
CALCIUM SPEC-SCNC: 10.5 MG/DL (ref 8.6–10.5)
CHLORIDE SERPL-SCNC: 99 MMOL/L (ref 98–107)
CHOLEST SERPL-MCNC: 161 MG/DL (ref 0–200)
CO2 SERPL-SCNC: 26.7 MMOL/L (ref 22–29)
CREAT SERPL-MCNC: 0.75 MG/DL (ref 0.57–1)
DEPRECATED RDW RBC AUTO: 35.2 FL (ref 37–54)
EGFRCR SERPLBLD CKD-EPI 2021: 89 ML/MIN/1.73
ERYTHROCYTE [DISTWIDTH] IN BLOOD BY AUTOMATED COUNT: 14.5 % (ref 12.3–15.4)
EXPIRATION DATE: ABNORMAL
GLOBULIN UR ELPH-MCNC: 2.9 GM/DL
GLUCOSE SERPL-MCNC: 103 MG/DL (ref 65–99)
HBA1C MFR BLD: 6.3 % (ref 4.5–5.7)
HCT VFR BLD AUTO: 38.8 % (ref 34–46.6)
HDLC SERPL-MCNC: 64 MG/DL (ref 40–60)
HGB BLD-MCNC: 12.1 G/DL (ref 12–15.9)
LDLC SERPL CALC-MCNC: 69 MG/DL (ref 0–100)
LDLC/HDLC SERPL: 1 {RATIO}
Lab: ABNORMAL
MCH RBC QN AUTO: 21.5 PG (ref 26.6–33)
MCHC RBC AUTO-ENTMCNC: 31.2 G/DL (ref 31.5–35.7)
MCV RBC AUTO: 68.9 FL (ref 79–97)
PLATELET # BLD AUTO: 452 10*3/MM3 (ref 140–450)
PMV BLD AUTO: 10.4 FL (ref 6–12)
POTASSIUM SERPL-SCNC: 4.2 MMOL/L (ref 3.5–5.2)
PROT SERPL-MCNC: 7.4 G/DL (ref 6–8.5)
RBC # BLD AUTO: 5.63 10*6/MM3 (ref 3.77–5.28)
SODIUM SERPL-SCNC: 140 MMOL/L (ref 136–145)
TRIGL SERPL-MCNC: 166 MG/DL (ref 0–150)
VLDLC SERPL-MCNC: 28 MG/DL (ref 5–40)
WBC NRBC COR # BLD AUTO: 7.24 10*3/MM3 (ref 3.4–10.8)

## 2025-08-18 PROCEDURE — 83036 HEMOGLOBIN GLYCOSYLATED A1C: CPT | Performed by: FAMILY MEDICINE

## 2025-08-18 PROCEDURE — 80053 COMPREHEN METABOLIC PANEL: CPT | Performed by: FAMILY MEDICINE

## 2025-08-18 PROCEDURE — 85027 COMPLETE CBC AUTOMATED: CPT | Performed by: FAMILY MEDICINE

## 2025-08-18 PROCEDURE — 80061 LIPID PANEL: CPT | Performed by: FAMILY MEDICINE

## 2025-08-18 PROCEDURE — 99214 OFFICE O/P EST MOD 30 MIN: CPT | Performed by: FAMILY MEDICINE

## 2025-08-18 PROCEDURE — 82607 VITAMIN B-12: CPT | Performed by: FAMILY MEDICINE

## 2025-08-18 PROCEDURE — 82306 VITAMIN D 25 HYDROXY: CPT | Performed by: FAMILY MEDICINE

## 2025-08-18 PROCEDURE — 36415 COLL VENOUS BLD VENIPUNCTURE: CPT | Performed by: FAMILY MEDICINE

## 2025-08-18 RX ORDER — TIRZEPATIDE 12.5 MG/.5ML
12.5 INJECTION, SOLUTION SUBCUTANEOUS WEEKLY
Qty: 2 ML | Refills: 2 | Status: SHIPPED | OUTPATIENT
Start: 2025-08-18

## 2025-08-19 LAB
25(OH)D3 SERPL-MCNC: 30.3 NG/ML (ref 30–100)
VIT B12 BLD-MCNC: 237 PG/ML (ref 211–946)

## 2025-08-25 DIAGNOSIS — E53.8 B12 DEFICIENCY: ICD-10-CM

## 2025-08-25 RX ORDER — SYRINGE W-NEEDLE,DISPOSAB,3 ML 25GX5/8"
1 SYRINGE, EMPTY DISPOSABLE MISCELLANEOUS DAILY
Qty: 10 EACH | Refills: 5 | Status: SHIPPED | OUTPATIENT
Start: 2025-08-25